# Patient Record
Sex: FEMALE | Race: WHITE | NOT HISPANIC OR LATINO | Employment: OTHER | URBAN - METROPOLITAN AREA
[De-identification: names, ages, dates, MRNs, and addresses within clinical notes are randomized per-mention and may not be internally consistent; named-entity substitution may affect disease eponyms.]

---

## 2017-02-17 ENCOUNTER — HOSPITAL ENCOUNTER (OUTPATIENT)
Dept: MAMMOGRAPHY | Facility: CLINIC | Age: 57
Discharge: HOME | End: 2017-02-17
Attending: FAMILY MEDICINE | Admitting: FAMILY MEDICINE
Payer: COMMERCIAL

## 2017-02-17 DIAGNOSIS — Z12.31 VISIT FOR SCREENING MAMMOGRAM: ICD-10-CM

## 2017-02-17 PROCEDURE — G0202 SCR MAMMO BI INCL CAD: HCPCS

## 2018-02-23 ENCOUNTER — OFFICE VISIT (OUTPATIENT)
Dept: PEDIATRICS | Facility: CLINIC | Age: 58
End: 2018-02-23
Payer: COMMERCIAL

## 2018-02-23 VITALS
HEART RATE: 66 BPM | TEMPERATURE: 98.2 F | SYSTOLIC BLOOD PRESSURE: 110 MMHG | OXYGEN SATURATION: 98 % | DIASTOLIC BLOOD PRESSURE: 68 MMHG

## 2018-02-23 DIAGNOSIS — Z01.411 ENCOUNTER FOR GYNECOLOGICAL EXAMINATION WITH ABNORMAL FINDING: Primary | ICD-10-CM

## 2018-02-23 DIAGNOSIS — Z12.4 SCREENING FOR MALIGNANT NEOPLASM OF CERVIX: ICD-10-CM

## 2018-02-23 DIAGNOSIS — Z13.220 SCREENING CHOLESTEROL LEVEL: ICD-10-CM

## 2018-02-23 DIAGNOSIS — Z13.1 SCREENING FOR DIABETES MELLITUS: ICD-10-CM

## 2018-02-23 DIAGNOSIS — L30.9 ECZEMA, UNSPECIFIED TYPE: ICD-10-CM

## 2018-02-23 DIAGNOSIS — Z11.59 NEED FOR HEPATITIS C SCREENING TEST: ICD-10-CM

## 2018-02-23 DIAGNOSIS — Z12.31 VISIT FOR SCREENING MAMMOGRAM: ICD-10-CM

## 2018-02-23 DIAGNOSIS — Z80.3 FAMILY HISTORY OF MALIGNANT NEOPLASM OF BREAST: ICD-10-CM

## 2018-02-23 DIAGNOSIS — N94.9 FEMALE PERINEAL PRESSURE: ICD-10-CM

## 2018-02-23 PROCEDURE — G0145 SCR C/V CYTO,THINLAYER,RESCR: HCPCS | Performed by: INTERNAL MEDICINE

## 2018-02-23 PROCEDURE — 99386 PREV VISIT NEW AGE 40-64: CPT | Performed by: INTERNAL MEDICINE

## 2018-02-23 PROCEDURE — 80061 LIPID PANEL: CPT | Performed by: INTERNAL MEDICINE

## 2018-02-23 PROCEDURE — 87624 HPV HI-RISK TYP POOLED RSLT: CPT | Performed by: INTERNAL MEDICINE

## 2018-02-23 PROCEDURE — 36415 COLL VENOUS BLD VENIPUNCTURE: CPT | Performed by: INTERNAL MEDICINE

## 2018-02-23 PROCEDURE — 80053 COMPREHEN METABOLIC PANEL: CPT | Performed by: INTERNAL MEDICINE

## 2018-02-23 PROCEDURE — 86803 HEPATITIS C AB TEST: CPT | Performed by: INTERNAL MEDICINE

## 2018-02-23 RX ORDER — HYDROCORTISONE 2.5 %
CREAM (GRAM) TOPICAL 2 TIMES DAILY PRN
Qty: 30 G | Refills: 11 | Status: SHIPPED | OUTPATIENT
Start: 2018-02-23 | End: 2019-03-15

## 2018-02-23 RX ORDER — HYDROCORTISONE 2.5 %
CREAM (GRAM) TOPICAL 2 TIMES DAILY
COMMUNITY
End: 2019-03-15

## 2018-02-23 NOTE — LETTER
March 2, 2018    Sury Castellanos  [address] [place] [address]    Dear Sury,  We are happy to inform you that your PAP smear result from 02/23/18 is normal.  We are now able to do a follow up test on PAP smears. The DNA test is for HPV (Human Papilloma Virus). Cervical cancer is closely linked with certain types of HPV. Your result showed no evidence of high risk HPV.  Therefore we recommend you return in 5 years for your next pap smear and HPV test.  You will still need to return to the clinic every year for an annual exam and other preventive tests.  Please contact the clinic at [phone] with any questions.  Sincerely,    Kamilah Lion MD/raymon

## 2018-02-23 NOTE — PATIENT INSTRUCTIONS
Preventive Health Recommendations  Female Ages 50 - 64    Yearly exam: See your health care provider every year in order to  o Review health changes.   o Discuss preventive care.    o Review your medicines if your doctor has prescribed any.      Get a Pap test every three years (unless you have an abnormal result and your provider advises testing more often).    If you get Pap tests with HPV test, you only need to test every 5 years, unless you have an abnormal result.     You do not need a Pap test if your uterus was removed (hysterectomy) and you have not had cancer.    You should be tested each year for STDs (sexually transmitted diseases) if you're at risk.     Have a mammogram every 1 to 2 years.    Have a colonoscopy at age 50, or have a yearly FIT test (stool test). These exams screen for colon cancer.      Have a cholesterol test every 5 years, or more often if advised.    Have a diabetes test (fasting glucose) every three years. If you are at risk for diabetes, you should have this test more often.     If you are at risk for osteoporosis (brittle bone disease), think about having a bone density scan (DEXA).    Shots: Get a flu shot each year. Get a tetanus shot every 10 years.    Nutrition:     Eat at least 5 servings of fruits and vegetables each day.    Eat whole-grain bread, whole-wheat pasta and brown rice instead of white grains and rice.    Talk to your provider about Calcium and Vitamin D.     Lifestyle    Exercise at least 150 minutes a week (30 minutes a day, 5 days a week). This will help you control your weight and prevent disease.    Limit alcohol to one drink per day.    No smoking.     Wear sunscreen to prevent skin cancer.     See your dentist every six months for an exam and cleaning.    See your eye doctor every 1 to 2 years.    ----------------------  1. Pap today with HPV test  2. Schedule mammogram - [phone]  3. Labs today: cholesterol, diabetes screen, liver function, kidney  function and hepatitis C screening  4. Refilled hydrocortisone cream  5. Can try replens vaginal moisturizer 2-3 times a week at bedtime to see if helps with pressure feeling  6. Will get previous records

## 2018-02-23 NOTE — LETTER
[facility]  [address]  [place] [address]                  [phone]   February 26, 2018    Sury Vannalizzy Castellanos  [address]  [facility] [address]      Dear Sury,    Here is a summary of your recent test results:    1. Your electrolytes, liver function kidney function, diabetes screen and hepatitis C screening are all normal.    2. Your cholesterol looks great. Your total cholesterol is 164 (normal is 200 or less). Your LDL or bad cholesterol is 91 (normal is less than 100). Your HDL or good cholesterol is 55 (normal is 50 or higher). I recommend rechecking your cholesterol in 3-5 years.    Please let me know if you have any questions.    Your test results are enclosed.      Please contact me if you have any questions.           Thank you very much for choosing [facility]    Best regards,    Kamilah Lion MD        Results for orders placed or performed in visit on 02/23/18   Hepatitis C Screen Reflex to HCV RNA Quant and Genotype   Result Value Ref Range    Hepatitis C Antibody Nonreactive NR^Nonreactive   Lipid panel reflex to direct LDL Fasting   Result Value Ref Range    Cholesterol 164 <200 mg/dL    Triglycerides 88 <150 mg/dL    HDL Cholesterol 55 >49 mg/dL    LDL Cholesterol Calculated 91 <100 mg/dL    Non HDL Cholesterol 109 <130 mg/dL   Comprehensive metabolic panel   Result Value Ref Range    Sodium 138 133 - 144 mmol/L    Potassium 4.2 3.4 - 5.3 mmol/L    Chloride 105 94 - 109 mmol/L    Carbon Dioxide 29 20 - 32 mmol/L    Anion Gap 4 3 - 14 mmol/L    Glucose 83 70 - 99 mg/dL    Urea Nitrogen 19 7 - 30 mg/dL    Creatinine 0.89 0.52 - 1.04 mg/dL    GFR Estimate 65 >60 mL/min/1.7m2    GFR Estimate If Black 79 >60 mL/min/1.7m2    Calcium 8.9 8.5 - 10.1 mg/dL    Bilirubin Total 0.5 0.2 - 1.3 mg/dL    Albumin 4.2 3.4 - 5.0 g/dL    Protein Total 7.4 6.8 - 8.8 g/dL    Alkaline Phosphatase 72 40 - 150 U/L    ALT 49 0 - 50 U/L    AST 25 0  - 45 U/L

## 2018-02-23 NOTE — MR AVS SNAPSHOT
After Visit Summary   2/23/2018    Sury Castellanos    MRN: [id number]           Patient Information     Date Of Birth          1960        Visit Information        Provider Department      2/23/2018 8:20 AM Kamilah Lion MD [facility]        Today's Diagnoses     Encounter for gynecological examination with abnormal finding    -  1    Screening for malignant neoplasm of cervix        Eczema, unspecified type        Need for hepatitis C screening test        Screening for diabetes mellitus        Screening cholesterol level        Visit for screening mammogram        Family history of malignant neoplasm of breast          Care Instructions      Preventive Health Recommendations  Female Ages 50 - 64    Yearly exam: See your health care provider every year in order to  o Review health changes.   o Discuss preventive care.    o Review your medicines if your doctor has prescribed any.      Get a Pap test every three years (unless you have an abnormal result and your provider advises testing more often).    If you get Pap tests with HPV test, you only need to test every 5 years, unless you have an abnormal result.     You do not need a Pap test if your uterus was removed (hysterectomy) and you have not had cancer.    You should be tested each year for STDs (sexually transmitted diseases) if you're at risk.     Have a mammogram every 1 to 2 years.    Have a colonoscopy at age 50, or have a yearly FIT test (stool test). These exams screen for colon cancer.      Have a cholesterol test every 5 years, or more often if advised.    Have a diabetes test (fasting glucose) every three years. If you are at risk for diabetes, you should have this test more often.     If you are at risk for osteoporosis (brittle bone disease), think about having a bone density scan (DEXA).    Shots: Get a flu shot each year. Get a tetanus shot every 10 years.    Nutrition:     Eat at least 5 servings of  fruits and vegetables each day.    Eat whole-grain bread, whole-wheat pasta and brown rice instead of white grains and rice.    Talk to your provider about Calcium and Vitamin D.     Lifestyle    Exercise at least 150 minutes a week (30 minutes a day, 5 days a week). This will help you control your weight and prevent disease.    Limit alcohol to one drink per day.    No smoking.     Wear sunscreen to prevent skin cancer.     See your dentist every six months for an exam and cleaning.    See your eye doctor every 1 to 2 years.    ----------------------  1. Pap today with HPV test  2. Schedule mammogram - [phone]  3. Labs today: cholesterol, diabetes screen, liver function, kidney function and hepatitis C screening  4. Refilled hydrocortisone cream  5. Can try replens vaginal moisturizer 2-3 times a week at bedtime to see if helps with pressure feeling  6. Will get previous records            Follow-ups after your visit        Follow-up notes from your care team     Return in about 1 year (around 2/23/2019).      Future tests that were ordered for you today     Open Future Orders        Priority Expected Expires Ordered    MA Screen Bilateral w/Lopez Routine  2/23/2019 2/23/2018            Who to contact     If you have questions or need follow up information about today's clinic visit or your schedule please contact [facility] directly at [phone].  Normal or non-critical lab and imaging results will be communicated to you by MyChart, letter or phone within 4 business days after the clinic has received the results. If you do not hear from us within 7 days, please contact the clinic through [employer] or phone. If you have a critical or abnormal lab result, we will notify you by phone as soon as possible.  Submit refill requests through [employer] or call your pharmacy and they will forward the refill request to us. Please allow 3 business days for your refill to be completed.          Additional  "Information About Your Visit        MyChart Information     [employer] lets you send messages to your doctor, view your test results, renew your prescriptions, schedule appointments and more. To sign up, go to [email or web address] . Click on \"Log in\" on the left side of the screen, which will take you to the Welcome page. Then click on \"Sign up Now\" on the right side of the page.     You will be asked to enter the access code listed below, as well as some personal information. Please follow the directions to create your username and password.     Your access code is: [email or web address]  Expires: 2018  8:41 AM     Your access code will  in 90 days. If you need help or a new code, please call your [place] clinic or [phone].        Care EveryWhere ID     This is your Care EveryWhere ID. This could be used by other organizations to access your [place] medical records  [id number]        Your Vitals Were     Pulse Temperature Pulse Oximetry             66 98.2  F (36.8  C) (Tympanic) 98%          Blood Pressure from Last 3 Encounters:   18 110/68   10/14/16 120/84    Weight from Last 3 Encounters:   10/14/16 153 lb (69.4 kg)   14 152 lb (68.9 kg)   13 150 lb (68 kg)              We Performed the Following     Comprehensive metabolic panel     Hepatitis C Screen Reflex to HCV RNA Quant and Genotype     HPV High Risk Types DNA Cervical     Lipid panel reflex to direct LDL Fasting     Pap imaged thin layer screen with HPV - recommended age 30 - 65 years (select HPV order below)          Today's Medication Changes          These changes are accurate as of 18  8:41 AM.  If you have any questions, ask your nurse or doctor.               These medicines have changed or have updated prescriptions.        Dose/Directions    * hydrocortisone 2.5 % cream   This may have changed:  Another medication with the same name was added. Make sure you understand how and when to take each.   Changed by:  " Kamilah Lion MD        Apply topically 2 times daily   Refills:  0       * hydrocortisone 2.5 % cream   This may have changed:  You were already taking a medication with the same name, and this prescription was added. Make sure you understand how and when to take each.   Used for:  Eczema, unspecified type   Changed by:  Kamilah Lion MD        Apply topically 2 times daily as needed Please place on file, do not fill   Quantity:  30 g   Refills:  11       * Notice:  This list has 2 medication(s) that are the same as other medications prescribed for you. Read the directions carefully, and ask your doctor or other care provider to review them with you.         Where to get your medicines      These medications [id number] [place] [facility] [address] AT [facility] [place] [id number], [place] [address]     Phone:  [phone]     hydrocortisone 2.5 % cream                Primary Care Provider Office Phone # Fax #    Anitha Serina Zavaleta [phone]       [facility] [address] [facility] [address]        Equal Access to Services     [facility] Hospital[facility]LLIE : Hadii aad ku hadasho Soomaali, waaxda luqadaha, qaybta kaalmada adeegyada, david robbins . So [facility] [phone].    ATENCIÓN: Si habla español, tiene a dumont disposición servicios gratuitos de asistencia lingüística. [facility] [phone].    We comply with applicable federal civil rights laws and [place] laws. We do not discriminate on the basis of race, color, national origin, age, disability, sex, sexual orientation, or gender identity.            Thank you!     Thank you for choosing [facility]  for your care. Our goal is always to provide you with excellent care. Hearing back from our patients is one way we can continue to improve our services. Please take a few minutes to complete the written survey that you may receive  in the mail after your visit with us. Thank you!             Your Updated Medication List - Protect others around you: Learn how to safely use, store and throw away your medicines at www.disposemymeds.org.          This list is accurate as of 2/23/18  8:41 AM.  Always use your most recent med list.                   Brand Name Dispense Instructions for use Diagnosis    ADVIL COLD/SINUS PO       Right foot pain, Peroneal tendinitis of right lower extremity, Left tibialis posterior tendinitis, Left foot pain, Pes cavus, congenital       diclofenac 1 % Gel topical gel    VOLTAREN    100 g    Apply 4 grams to knees or 2 grams to hands four times daily using enclosed dosing card.    Right foot pain, Peroneal tendinitis of right lower extremity, Left tibialis posterior tendinitis, Left foot pain, Pes cavus, congenital       * hydrocortisone 2.5 % cream      Apply topically 2 times daily        * hydrocortisone 2.5 % cream     30 g    Apply topically 2 times daily as needed Please place on file, do not fill    Eczema, unspecified type       MULTIVITAMIN ADULT PO       Right foot pain, Peroneal tendinitis of right lower extremity, Left tibialis posterior tendinitis, Left foot pain, Pes cavus, congenital       VITAMIN C PO       Right foot pain, Peroneal tendinitis of right lower extremity, Left tibialis posterior tendinitis, Left foot pain, Pes cavus, congenital       * Notice:  This list has 2 medication(s) that are the same as other medications prescribed for you. Read the directions carefully, and ask your doctor or other care provider to review them with you.

## 2018-02-23 NOTE — PROGRESS NOTES
"   SUBJECTIVE:   CC: Sury Castellanos is an 57 year old woman who presents for preventive health visit.     Physical   Annual:     Getting at least 3 servings of Calcium per day::  Yes    Bi-annual eye exam::  Yes    Dental care twice a year::  Yes    Sleep apnea or symptoms of sleep apnea::  None    Diet::  Regular (no restrictions)    Frequency of exercise::  2-3 days/week    Duration of exercise::  15-30 minutes    Taking medications regularly::  Yes    Medication side effects::  Not applicable    Additional concerns today::  YES          Patient new to clinic. Previous care at [facility]. [facility] completed.  passed away last fall.     Concerns today:   1. hydrocortisone cream prescription refill - uses for eczema.  2. Feels pressure when sitting - started a few weeks ago. Sits a lot at work. Feels like when \"tampon is dry.\" last period at 50. Wonders if due to vaginal dryness.  3. Would like mammo this year due to family history - mother and sister    Colonoscopy at 50 and normal  Last pap 2 years ago and normal  Reports Tdap UTD    Today's PHQ-2 Score:   PHQ-2 ( 1999 [employer]) 2/23/2018   Q1: Little interest or pleasure in doing things 0   Q2: Feeling down, depressed or hopeless 0   PHQ-2 Score 0   Q1: Little interest or pleasure in doing things Not at all   Q2: Feeling down, depressed or hopeless Not at all   PHQ-2 Score 0     Abuse: Current or Past(Physical, Sexual or Emotional)- No  Do you feel safe in your environment - Yes    Social History   Substance Use Topics     Smoking status: Never Smoker     Smokeless tobacco: Never Used     Alcohol use 0.0 oz/week     0 Standard drinks or equivalent per week     Alcohol Use 2/23/2018   If you drink alcohol, do you typically have greater than 3 drinks per day OR greater than 7 drinks per week?   No     Reviewed orders with patient.  Reviewed health maintenance and updated orders accordingly - Yes  Patient Active Problem List   Diagnosis     " Family history of malignant neoplasm of breast     Eczema, unspecified type     Past Surgical History:   Procedure Laterality Date     GYN SURGERY      uterine polyp removal       Social History   Substance Use Topics     Smoking status: Never Smoker     Smokeless tobacco: Never Used     Alcohol use 0.0 oz/week     0 Standard drinks or equivalent per week     Family History   Problem Relation Age of Onset     Breast Cancer Mother 58     Lung Cancer Father      smoker     Breast Cancer Sister 40           Patient over age 50, mutual decision to screen reflected in health maintenance.    Pertinent mammograms are reviewed under the imaging tab.  History of abnormal Pap smear: NO - age 30-65 PAP every 5 years with negative HPV co-testing recommended    Reviewed and updated as needed this visit by clinical staff  Tobacco  Allergies  Meds  Problems  Med Hx  Surg Hx  Fam Hx  Soc Hx        Reviewed and updated as needed this visit by Provider  Allergies  Meds  Problems        Review of Systems  C: NEGATIVE for fever, chills, change in weight  I: NEGATIVE for worrisome rashes, moles or lesions  E: NEGATIVE for vision changes or irritation  ENT: NEGATIVE for ear, mouth and throat problems  R: NEGATIVE for significant cough or SOB  B: NEGATIVE for masses, tenderness or discharge  CV: NEGATIVE for chest pain, palpitations or peripheral edema  GI: NEGATIVE for nausea, abdominal pain, heartburn, or change in bowel habits  : perineal pressure with sitting, NEGATIVE for unusual urinary or vaginal symptoms. No vaginal bleeding.  M: NEGATIVE for significant arthralgias or myalgia  N: NEGATIVE for weakness, dizziness or paresthesias  P: NEGATIVE for changes in mood or affect      OBJECTIVE:   /68 (Cuff Size: Adult Regular)  Pulse 66  Temp 98.2  F (36.8  C) (Tympanic)  SpO2 98%  Physical Exam  GENERAL APPEARANCE: healthy, alert and no distress  EYES: Eyes grossly normal to inspection, PERRL and conjunctivae and  sclerae normal  HENT: ear canals and TM's normal, nose and mouth without ulcers or lesions, oropharynx clear and oral mucous membranes moist  NECK: no adenopathy, no asymmetry, masses, or scars and thyroid normal to palpation  RESP: lungs clear to auscultation - no rales, rhonchi or wheezes  BREAST: normal without masses, tenderness or nipple discharge and no palpable axillary masses or adenopathy  CV: regular rate and rhythm, normal S1 S2, no S3 or S4, no murmur, click or rub, no peripheral edema and peripheral pulses strong  ABDOMEN: soft, nontender, no hepatosplenomegaly, no masses and bowel sounds normal   (female): normal female external genitalia, normal urethral meatus, vaginal mucosal atrophy noted, normal cervix, adnexae, and uterus without masses or abnormal discharge  MS: no musculoskeletal defects are noted and gait is age appropriate without ataxia  SKIN: eczematous patch over right inferior buttock, no suspicious lesions or rashes  NEURO: Normal strength and tone, sensory exam grossly normal, mentation intact and speech normal  PSYCH: mentation appears normal and affect normal/bright    ASSESSMENT/PLAN:   1. Encounter for gynecological examination with abnormal finding  Pap today, will schedule mammo  Colonoscopy UTD  Patient thinks Tdap UTD - will get records    2. Screening for malignant neoplasm of cervix  - Pap imaged thin layer screen with HPV - recommended age 30 - 65 years (select HPV order below)  - HPV High Risk Types DNA Cervical    3. Eczema, unspecified type  Controlled with hydrocortisone cream  - hydrocortisone 2.5 % cream; Apply topically 2 times daily as needed Please place on file, do not fill  Dispense: 30 g; Refill: 11    4. Female perineal pressure  No prolapse on exam. Normal bimanual exam. Suspect this is due to vaginal atrophy/dryness. Discussed Replens 2-3 times a week at night. If not improving, would consider pelvic ultrasound    5. Need for hepatitis C screening test  -  "Hepatitis C Screen Reflex to HCV RNA Quant and Genotype    6. Screening for diabetes mellitus  - Comprehensive metabolic panel    7. Screening cholesterol level  - Lipid panel reflex to direct LDL Fasting    8. Visit for screening mammogram  - MA Screen Bilateral w/Lopez; Future    9. Family history of malignant neoplasm of breast  Yearly mammo    COUNSELING:  Reviewed preventive health counseling, as reflected in patient instructions  Special attention given to:        Regular exercise       Healthy diet/nutrition       Colon cancer screening       Consider Hep C screening for patients born between 1945 and 1965       (Joann)menopause management     reports that she has never smoked. She has never used smokeless tobacco.    Estimated body mass index is 24.32 kg/(m^2) as calculated from the following:    Height as of 10/14/16: 5' 6.5\" (1.689 m).    Weight as of 10/14/16: 153 lb (69.4 kg).     Counseling Resources:  ATP IV Guidelines  Pooled Cohorts Equation Calculator  Breast Cancer Risk Calculator  FRAX Risk Assessment  ICSI Preventive Guidelines  Dietary Guidelines for Americans, 2010  USDA's MyPlate  ASA Prophylaxis  Lung CA Screening    Kamilah Lion MD  [facility] [place]  "

## 2018-02-24 LAB — HCV AB SERPL QL IA: NONREACTIVE

## 2018-02-25 LAB
ALBUMIN SERPL-MCNC: 4.2 G/DL (ref 3.4–5)
ALP SERPL-CCNC: 72 U/L (ref 40–150)
ALT SERPL W P-5'-P-CCNC: 49 U/L (ref 0–50)
ANION GAP SERPL CALCULATED.3IONS-SCNC: 4 MMOL/L (ref 3–14)
AST SERPL W P-5'-P-CCNC: 25 U/L (ref 0–45)
BILIRUB SERPL-MCNC: 0.5 MG/DL (ref 0.2–1.3)
BUN SERPL-MCNC: 19 MG/DL (ref 7–30)
CALCIUM SERPL-MCNC: 8.9 MG/DL (ref 8.5–10.1)
CHLORIDE SERPL-SCNC: 105 MMOL/L (ref 94–109)
CHOLEST SERPL-MCNC: 164 MG/DL
CO2 SERPL-SCNC: 29 MMOL/L (ref 20–32)
CREAT SERPL-MCNC: 0.89 MG/DL (ref 0.52–1.04)
GFR SERPL CREATININE-BSD FRML MDRD: 65 ML/MIN/1.7M2
GLUCOSE SERPL-MCNC: 83 MG/DL (ref 70–99)
HDLC SERPL-MCNC: 55 MG/DL
LDLC SERPL CALC-MCNC: 91 MG/DL
NONHDLC SERPL-MCNC: 109 MG/DL
POTASSIUM SERPL-SCNC: 4.2 MMOL/L (ref 3.4–5.3)
PROT SERPL-MCNC: 7.4 G/DL (ref 6.8–8.8)
SODIUM SERPL-SCNC: 138 MMOL/L (ref 133–144)
TRIGL SERPL-MCNC: 88 MG/DL

## 2018-02-27 LAB
COPATH REPORT: NORMAL
PAP: NORMAL

## 2018-03-01 LAB
FINAL DIAGNOSIS: NORMAL
HPV HR 12 DNA CVX QL NAA+PROBE: NEGATIVE
HPV16 DNA SPEC QL NAA+PROBE: NEGATIVE
HPV18 DNA SPEC QL NAA+PROBE: NEGATIVE
SPECIMEN DESCRIPTION: NORMAL
SPECIMEN SOURCE CVX/VAG CYTO: NORMAL

## 2018-03-09 ENCOUNTER — HOSPITAL ENCOUNTER (OUTPATIENT)
Dept: MAMMOGRAPHY | Facility: CLINIC | Age: 58
Discharge: HOME | End: 2018-03-09
Attending: INTERNAL MEDICINE | Admitting: INTERNAL MEDICINE
Payer: COMMERCIAL

## 2018-03-09 DIAGNOSIS — Z12.31 VISIT FOR SCREENING MAMMOGRAM: ICD-10-CM

## 2018-03-09 PROCEDURE — 77063 BREAST TOMOSYNTHESIS BI: CPT

## 2019-03-11 ENCOUNTER — DOCUMENTATION ONLY (OUTPATIENT)
Dept: PEDIATRICS | Facility: CLINIC | Age: 59
End: 2019-03-11

## 2019-03-11 DIAGNOSIS — Z13.220 SCREENING CHOLESTEROL LEVEL: Primary | ICD-10-CM

## 2019-03-11 DIAGNOSIS — Z13.1 SCREENING FOR DIABETES MELLITUS: ICD-10-CM

## 2019-03-11 NOTE — PROGRESS NOTES
Patient has an up coming lab appointment on  03/14/19 . Please review and place future orders that may be needed.    Thank you,  Sangeeta Duval MLT

## 2019-03-15 ENCOUNTER — ANCILLARY PROCEDURE (OUTPATIENT)
Dept: MAMMOGRAPHY | Facility: CLINIC | Age: 59
End: 2019-03-15
Attending: INTERNAL MEDICINE
Payer: COMMERCIAL

## 2019-03-15 ENCOUNTER — OFFICE VISIT (OUTPATIENT)
Dept: PEDIATRICS | Facility: CLINIC | Age: 59
End: 2019-03-15
Payer: COMMERCIAL

## 2019-03-15 VITALS
OXYGEN SATURATION: 98 % | HEART RATE: 59 BPM | WEIGHT: 164.1 LBS | DIASTOLIC BLOOD PRESSURE: 70 MMHG | SYSTOLIC BLOOD PRESSURE: 120 MMHG | TEMPERATURE: 98.2 F | BODY MASS INDEX: 26.37 KG/M2 | HEIGHT: 66 IN

## 2019-03-15 DIAGNOSIS — Z12.39 BREAST CANCER SCREENING: ICD-10-CM

## 2019-03-15 DIAGNOSIS — Z00.00 ROUTINE HISTORY AND PHYSICAL EXAMINATION OF ADULT: Primary | ICD-10-CM

## 2019-03-15 DIAGNOSIS — L30.9 ECZEMA, UNSPECIFIED TYPE: ICD-10-CM

## 2019-03-15 DIAGNOSIS — Z23 NEED FOR PROPHYLACTIC VACCINATION WITH TETANUS-DIPHTHERIA (TD): ICD-10-CM

## 2019-03-15 PROCEDURE — 77067 SCR MAMMO BI INCL CAD: CPT | Mod: TC

## 2019-03-15 PROCEDURE — 99396 PREV VISIT EST AGE 40-64: CPT | Mod: 25 | Performed by: INTERNAL MEDICINE

## 2019-03-15 PROCEDURE — 90715 TDAP VACCINE 7 YRS/> IM: CPT | Performed by: INTERNAL MEDICINE

## 2019-03-15 PROCEDURE — 90471 IMMUNIZATION ADMIN: CPT | Performed by: INTERNAL MEDICINE

## 2019-03-15 PROCEDURE — 77063 BREAST TOMOSYNTHESIS BI: CPT | Mod: TC

## 2019-03-15 RX ORDER — HYDROCORTISONE 2.5 %
CREAM (GRAM) TOPICAL 2 TIMES DAILY PRN
Qty: 30 G | Refills: 11 | Status: SHIPPED | OUTPATIENT
Start: 2019-03-15 | End: 2020-07-10

## 2019-03-15 ASSESSMENT — ENCOUNTER SYMPTOMS
CONSTIPATION: 0
ROS SKIN COMMENTS: ECZEMA
MYALGIAS: 0
DYSURIA: 0
HEMATURIA: 0
NERVOUS/ANXIOUS: 0
PALPITATIONS: 0
BREAST MASS: 0
DIZZINESS: 0
SHORTNESS OF BREATH: 0
SORE THROAT: 0
HEARTBURN: 0
FEVER: 0
NAUSEA: 0
ABDOMINAL PAIN: 0
HEMATOCHEZIA: 0
PARESTHESIAS: 0
CHILLS: 0
JOINT SWELLING: 0
DIARRHEA: 0
FREQUENCY: 0
COUGH: 0
EYE PAIN: 0
HEADACHES: 0
WEAKNESS: 0

## 2019-03-15 ASSESSMENT — MIFFLIN-ST. JEOR: SCORE: 1341.1

## 2019-03-15 NOTE — PATIENT INSTRUCTIONS
Preventive Health Recommendations  Female Ages 50 - 64    Yearly exam: See your health care provider every year in order to  o Review health changes.   o Discuss preventive care.    o Review your medicines if your doctor has prescribed any.      Get a Pap test every three years (unless you have an abnormal result and your provider advises testing more often).    If you get Pap tests with HPV test, you only need to test every 5 years, unless you have an abnormal result.     You do not need a Pap test if your uterus was removed (hysterectomy) and you have not had cancer.    You should be tested each year for STDs (sexually transmitted diseases) if you're at risk.     Have a mammogram every 1 to 2 years.    Have a colonoscopy at age 50, or have a yearly FIT test (stool test). These exams screen for colon cancer.      Have a cholesterol test every 5 years, or more often if advised.    Have a diabetes test (fasting glucose) every three years. If you are at risk for diabetes, you should have this test more often.     If you are at risk for osteoporosis (brittle bone disease), think about having a bone density scan (DEXA).    Shots: Get a flu shot each year. Get a tetanus shot every 10 years.    Nutrition:     Eat at least 5 servings of fruits and vegetables each day.    Eat whole-grain bread, whole-wheat pasta and brown rice instead of white grains and rice.    Get adequate Calcium and Vitamin D.     Lifestyle    Exercise at least 150 minutes a week (30 minutes a day, 5 days a week). This will help you control your weight and prevent disease.    Limit alcohol to one drink per day.    No smoking.     Wear sunscreen to prevent skin cancer.     See your dentist every six months for an exam and cleaning.    See your eye doctor every 1 to 2 years.  ----------------  1. Hydrocortisone 2.5% cream 2 times daily as needed   - if not improving over next 1-2 months, let me know and will send a prescription for a stronger steroid  cream (triamcinolone)  2. Tetanus/whooping cough vaccine today  3. Check with insurance to see if Shingrix shingles vaccine is covered OR can stop in pharmacy and have them run it through your insurance and get the vaccine done there.  - 2nd dose is 2-6 months after the first dose

## 2019-03-15 NOTE — PROGRESS NOTES
SUBJECTIVE:   CC: Sury Castellanos is an 58 year old woman who presents for preventive health visit.     Physical   Annual:     Getting at least 3 servings of Calcium per day:  Yes    Bi-annual eye exam:  Yes    Dental care twice a year:  Yes    Sleep apnea or symptoms of sleep apnea:  None    Diet:  Regular (no restrictions)    Frequency of exercise:  2-3 days/week    Duration of exercise:  45-60 minutes    Taking medications regularly:  Yes    Medication side effects:  None    Additional concerns today:  Yes    PHQ-2 Total Score: 0    Eczema flare left elbow, would like cream reordered - prescribed 2.5% hydrocortisone last year, mague never got filled because didn't think was that bad. Now worse again and 1% cream not helping. Never completely goes away.    Colonoscopy - will be off of 's insurance in September. Currently on Cobra. Will turn 60 a couple of months later. Wondering if can do colonoscopy earlier    Today's PHQ-2 Score:   PHQ-2 ( 1999 Pfizer) 3/15/2019   Q1: Little interest or pleasure in doing things 0   Q2: Feeling down, depressed or hopeless 0   PHQ-2 Score 0   Q1: Little interest or pleasure in doing things Not at all   Q2: Feeling down, depressed or hopeless Not at all   PHQ-2 Score 0       Abuse: Current or Past(Physical, Sexual or Emotional)- No  Do you feel safe in your environment? Yes    Social History     Tobacco Use     Smoking status: Never Smoker     Smokeless tobacco: Never Used   Substance Use Topics     Alcohol use: Yes     Alcohol/week: 0.0 oz     Alcohol Use 3/15/2019   If you drink alcohol do you typically have greater than 3 drinks per day OR greater than 7 drinks per week? No   No flowsheet data found.    Reviewed orders with patient.  Reviewed health maintenance and updated orders accordingly - Yes  Patient Active Problem List   Diagnosis     Family history of malignant neoplasm of breast     Eczema, unspecified type     Past Surgical History:   Procedure Laterality  Date     GYN SURGERY      uterine polyp removal       Social History     Tobacco Use     Smoking status: Never Smoker     Smokeless tobacco: Never Used   Substance Use Topics     Alcohol use: Yes     Alcohol/week: 0.0 oz     Family History   Problem Relation Age of Onset     Breast Cancer Mother 58     Lung Cancer Father         smoker     Breast Cancer Sister 40           Mammogram Screening: Patient over age 50, mutual decision to screen reflected in health maintenance.    Pertinent mammograms are reviewed under the imaging tab.  History of abnormal Pap smear: NO - age 30-65 PAP every 5 years with negative HPV co-testing recommended  PAP / HPV Latest Ref Rng & Units 2/23/2018   PAP - NIL   HPV 16 DNA NEG:Negative Negative   HPV 18 DNA NEG:Negative Negative   OTHER HR HPV NEG:Negative Negative     Reviewed and updated as needed this visit by clinical staff  Tobacco  Allergies  Meds  Problems  Med Hx  Surg Hx  Fam Hx  Soc Hx          Reviewed and updated as needed this visit by Provider  Tobacco  Allergies  Meds  Problems  Med Hx  Surg Hx  Fam Hx          Review of Systems   Constitutional: Negative for chills and fever.   HENT: Negative for congestion, ear pain, hearing loss and sore throat.    Eyes: Negative for pain and visual disturbance.   Respiratory: Negative for cough and shortness of breath.    Cardiovascular: Negative for chest pain, palpitations and peripheral edema.   Gastrointestinal: Negative for abdominal pain, constipation, diarrhea, heartburn, hematochezia and nausea.   Breasts:  Negative for tenderness, breast mass and discharge.   Genitourinary: Negative for dysuria, frequency, genital sores, hematuria, pelvic pain, urgency, vaginal bleeding and vaginal discharge.   Musculoskeletal: Negative for joint swelling and myalgias.   Skin: Negative for rash.        eczema   Neurological: Negative for dizziness, weakness, headaches and paresthesias.   Psychiatric/Behavioral: Negative for mood  "changes. The patient is not nervous/anxious.    All other systems reviewed and are negative.    OBJECTIVE:   /70 (BP Location: Right arm, Patient Position: Sitting, Cuff Size: Adult Regular)   Pulse 59   Temp 98.2  F (36.8  C) (Tympanic)   Ht 1.676 m (5' 6\")   Wt 74.4 kg (164 lb 1.6 oz)   SpO2 98%   BMI 26.49 kg/m    Physical Exam  GENERAL: healthy, alert and no distress  EYES: Eyes grossly normal to inspection, PERRL and conjunctivae and sclerae normal  HENT: ear canals and TM's normal, nose and mouth without ulcers or lesions  NECK: no adenopathy, no asymmetry, masses, or scars and thyroid normal to palpation  RESP: lungs clear to auscultation - no rales, rhonchi or wheezes  BREAST: deferred to mammo earlier today  CV: regular rate and rhythm, normal S1 S2, no S3 or S4, no murmur, click or rub, no peripheral edema and peripheral pulses strong  ABDOMEN: soft, nontender, no hepatosplenomegaly, no masses and bowel sounds normal  MS: no gross musculoskeletal defects noted, no edema  SKIN: large eczematous plaque over left elbow about 3 x 7 cm in size with some scaling.  NEURO: Normal strength and tone, mentation intact and speech normal  PSYCH: mentation appears normal, affect normal/bright    Diagnostic Test Results:  Results for orders placed or performed in visit on 03/15/19 (from the past 24 hour(s))   MA Screen Bilateral w/Lopez    Narrative    Examination: Bilateral digital screening mammography with computer  aided detection including digital breast tomosynthesis, 3/15/2019 1:00  PM.    Comparison: 03/09/18, 02/17/17, 04/11/14, 03/29/13    History: No current breast concerns. Mother and sister with breast  cancer. Patient reports prior benign biopsy.    BREAST DENSITY: Heterogeneously dense.    COMMENTS:  No suspicious finding.  Biopsy marker in the RIGHT breast.      Impression    IMPRESSION: BI-RADS CATEGORY: 1 -  NEGATIVE.    RECOMMENDED FOLLOW-UP: Annual Mammography.      The patient will be " "notified of the results.     STEWART MATUTE MD       ASSESSMENT/PLAN:   1. Routine history and physical examination of adult  Pap, mammo, colonoscopy UTD  Will check with insurance to see if they would cover colonoscopy before 10 years is up.  Tdap today  Declines flu vaccine  Discussed Shingrix vaccine    2. Eczema, unspecified type  Never tried therapy discussed last year - will start with this. If not responding in next month or two, will let me know and will switch to triamcinolone cream.  - hydrocortisone 2.5 % cream; Apply topically 2 times daily as needed for rash  Dispense: 30 g; Refill: 11    3. Need for prophylactic vaccination with tetanus-diphtheria (Td)  - TDAP VACCINE (ADACEL)  -      ADMIN VACCINE, FIRST    COUNSELING:  Reviewed preventive health counseling, as reflected in patient instructions  Special attention given to:        Regular exercise       Healthy diet/nutrition       Immunizations    Vaccinated for: TDAP         Colon cancer screening    BP Readings from Last 1 Encounters:   03/15/19 120/70     Estimated body mass index is 26.49 kg/m  as calculated from the following:    Height as of this encounter: 1.676 m (5' 6\").    Weight as of this encounter: 74.4 kg (164 lb 1.6 oz).    BP Screening:   Last 3 BP Readings:    BP Readings from Last 3 Encounters:   03/15/19 120/70   02/23/18 110/68   10/14/16 120/84     The following was recommended to the patient:  Re-screen BP within a year and recommended lifestyle modifications  Weight management plan: Discussed healthy diet and exercise guidelines     reports that  has never smoked. she has never used smokeless tobacco.      Counseling Resources:  ATP IV Guidelines  Pooled Cohorts Equation Calculator  Breast Cancer Risk Calculator  FRAX Risk Assessment  ICSI Preventive Guidelines  Dietary Guidelines for Americans, 2010  USDA's MyPlate  ASA Prophylaxis  Lung CA Screening    Kamilah Lion MD  [facility] [place]  "

## 2019-06-20 ENCOUNTER — TELEPHONE (OUTPATIENT)
Dept: PEDIATRICS | Facility: CLINIC | Age: 59
End: 2019-06-20

## 2019-06-20 DIAGNOSIS — L30.9 ECZEMA, UNSPECIFIED TYPE: Primary | ICD-10-CM

## 2019-06-20 NOTE — TELEPHONE ENCOUNTER
Reason for call:  Other   Patient called regarding (reason for call): prescription  Additional comments: Patient would like to see if something else can be called in or a stronger dosage for the hydrocortisone 2.5 cream.  Dr Lion told her if it wasn't working the best to call and have something else called in.  Uses [employer]    Phone number to reach patient:  Cell number on file:    Telephone Information:   Mobile [phone]       Best Time:  any    Can we leave a detailed message on this number?  YES

## 2019-06-21 RX ORDER — TRIAMCINOLONE ACETONIDE 5 MG/G
1 OINTMENT TOPICAL 2 TIMES DAILY
Qty: 30 G | Refills: 0 | Status: SHIPPED | OUTPATIENT
Start: 2019-06-21 | End: 2020-07-10

## 2019-06-21 NOTE — TELEPHONE ENCOUNTER
"Per 3/15/19 office visit with PCP: \"SKIN: large eczematous plaque over left elbow about 3 x 7 cm in size with some scaling.    Plan: 2. Eczema, unspecified type  Never tried therapy discussed last year - will start with this. If not responding in next month or two, will let me know and will switch to triamcinolone cream.  - hydrocortisone 2.5 % cream; Apply topically 2 times daily as needed for rash  Dispense: 30 g; Refill: 11\"    T'd up, please send if ok.  Yudy Juan RN  Message handled by Nurse Triage.    "

## 2020-03-07 ENCOUNTER — OFFICE VISIT (OUTPATIENT)
Dept: URGENT CARE | Facility: URGENT CARE | Age: 60
End: 2020-03-07
Payer: COMMERCIAL

## 2020-03-07 VITALS
HEART RATE: 56 BPM | DIASTOLIC BLOOD PRESSURE: 87 MMHG | TEMPERATURE: 98 F | OXYGEN SATURATION: 97 % | SYSTOLIC BLOOD PRESSURE: 129 MMHG

## 2020-03-07 DIAGNOSIS — L03.012 CELLULITIS OF LEFT MIDDLE FINGER: Primary | ICD-10-CM

## 2020-03-07 PROCEDURE — 99213 OFFICE O/P EST LOW 20 MIN: CPT | Performed by: PHYSICIAN ASSISTANT

## 2020-03-07 RX ORDER — CEPHALEXIN 500 MG/1
500 CAPSULE ORAL 3 TIMES DAILY
Qty: 21 CAPSULE | Refills: 0 | Status: SHIPPED | OUTPATIENT
Start: 2020-03-07 | End: 2020-07-10

## 2020-03-08 NOTE — PROGRESS NOTES
SUBJECTIVE:  Sury Castellanos is a 59 year old female who presents complaining of left third finger pain.  She has noted some redness and swelling along the cuticle margin.  Symptoms began 3-4 days ago.   Severity: mild.  She denies any trauma to the area.  No fevers or chills noted.  No migration of redness or swelling proximally.      Past Medical History:   Diagnosis Date     Eczema, unspecified type 2/23/2018     Current Outpatient Medications   Medication Sig Dispense Refill     hydrocortisone 2.5 % cream Apply topically 2 times daily as needed for rash (Patient not taking: Reported on 3/7/2020) 30 g 11     Multiple Vitamins-Minerals (MULTIVITAMIN ADULT PO)        Pseudoephedrine-Ibuprofen (ADVIL COLD/SINUS PO)        triamcinolone (KENALOG) 0.5 % external ointment Apply 1 g topically 2 times daily (Patient not taking: Reported on 3/7/2020) 30 g 0     Social History     Tobacco Use     Smoking status: Never Smoker     Smokeless tobacco: Never Used   Substance Use Topics     Alcohol use: Yes     Alcohol/week: 0.0 standard drinks       ROS:  Review of Systems  Complete ROS negative except as stated above    OBJECTIVE:  /87   Pulse 56   Temp 98  F (36.7  C)   SpO2 97%   Hand exam:  examination of third finger reveals paronychia with redness, tenderness and swelling along distal finger.      assessment/plan:  (L03.012) Cellulitis of left middle finger  (primary encounter diagnosis)  Comment:   Plan: cephALEXin (KEFLEX) 500 MG capsule        Med as directed and OTC med for sx relief. Signs of spreading infection discussed and to Follow-up with PCP as needed if sx worsen or new sx develop

## 2020-07-10 ENCOUNTER — OFFICE VISIT (OUTPATIENT)
Dept: PEDIATRICS | Facility: CLINIC | Age: 60
End: 2020-07-10
Payer: COMMERCIAL

## 2020-07-10 ENCOUNTER — ANCILLARY PROCEDURE (OUTPATIENT)
Dept: MAMMOGRAPHY | Facility: CLINIC | Age: 60
End: 2020-07-10
Attending: INTERNAL MEDICINE
Payer: COMMERCIAL

## 2020-07-10 VITALS
TEMPERATURE: 98.5 F | SYSTOLIC BLOOD PRESSURE: 118 MMHG | HEIGHT: 66 IN | HEART RATE: 60 BPM | WEIGHT: 154 LBS | OXYGEN SATURATION: 99 % | BODY MASS INDEX: 24.75 KG/M2 | DIASTOLIC BLOOD PRESSURE: 79 MMHG | RESPIRATION RATE: 16 BRPM

## 2020-07-10 DIAGNOSIS — Z12.31 VISIT FOR SCREENING MAMMOGRAM: ICD-10-CM

## 2020-07-10 DIAGNOSIS — S46.812A STRAIN OF LEFT DELTOID MUSCLE, INITIAL ENCOUNTER: ICD-10-CM

## 2020-07-10 DIAGNOSIS — Z00.00 ROUTINE GENERAL MEDICAL EXAMINATION AT A HEALTH CARE FACILITY: Primary | ICD-10-CM

## 2020-07-10 PROCEDURE — 80053 COMPREHEN METABOLIC PANEL: CPT | Performed by: NURSE PRACTITIONER

## 2020-07-10 PROCEDURE — 36415 COLL VENOUS BLD VENIPUNCTURE: CPT | Performed by: NURSE PRACTITIONER

## 2020-07-10 PROCEDURE — 99396 PREV VISIT EST AGE 40-64: CPT | Performed by: NURSE PRACTITIONER

## 2020-07-10 PROCEDURE — 77067 SCR MAMMO BI INCL CAD: CPT | Mod: TC

## 2020-07-10 PROCEDURE — 80061 LIPID PANEL: CPT | Performed by: NURSE PRACTITIONER

## 2020-07-10 ASSESSMENT — MIFFLIN-ST. JEOR: SCORE: 1290.29

## 2020-07-10 NOTE — PROGRESS NOTES
SUBJECTIVE:   CC: Sury Castellanos is an 59 year old woman who presents for preventive health visit.     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     One month history of L bicep pain that comes and goes, worsening. Pain can extend from shoulder down to bicep. Hurts more with shoulder flexion and external rotation. Denies peripheral numbness, uses OTC NSAID with relief.     Today's PHQ-2 Score:   PHQ-2 ( 1999 Pfizer) 7/10/2020   Q1: Little interest or pleasure in doing things 0   Q2: Feeling down, depressed or hopeless 0   PHQ-2 Score 0   Q1: Little interest or pleasure in doing things -   Q2: Feeling down, depressed or hopeless -   PHQ-2 Score -       Abuse: Current or Past(Physical, Sexual or Emotional)- No  Do you feel safe in your environment? Yes    Have you ever done Advance Care Planning? (For example, a Health Directive, POLST, or a discussion with a medical provider or your loved ones about your wishes): Yes, patient states has an Advance Care Planning document and will bring a copy to the clinic.    Social History     Tobacco Use     Smoking status: Never Smoker     Smokeless tobacco: Never Used   Substance Use Topics     Alcohol use: Yes     Alcohol/week: 0.0 standard drinks         Alcohol Use 3/15/2019   Prescreen: >3 drinks/day or >7 drinks/week? No   Prescreen: >3 drinks/day or >7  "drinks/week? -       Reviewed orders with patient.  Reviewed health maintenance and updated orders accordingly - Yes  Lab work is in process    Mammogram Screening: Patient over age 50, mutual decision to screen reflected in health maintenance.    Pertinent mammograms are reviewed under the imaging tab.  History of abnormal Pap smear: NO - age 30-65 PAP every 5 years with negative HPV co-testing recommended  PAP / HPV Latest Ref Rng & Units 2/23/2018   PAP - NIL   HPV 16 DNA NEG:Negative Negative   HPV 18 DNA NEG:Negative Negative   OTHER HR HPV NEG:Negative Negative     Reviewed and updated as needed this visit by clinical staff  Tobacco  Allergies  Meds  Med Hx  Surg Hx  Fam Hx  Soc Hx        Reviewed and updated as needed this visit by Provider  Meds            Review of Systems  CONSTITUTIONAL: NEGATIVE for fever, chills, change in weight  INTEGUMENTARY/SKIN: NEGATIVE for worrisome rashes, moles or lesions  EYES: NEGATIVE for vision changes or irritation  ENT: NEGATIVE for ear, mouth and throat problems  RESP: NEGATIVE for significant cough or SOB  BREAST: NEGATIVE for masses, tenderness or discharge  CV: NEGATIVE for chest pain, palpitations or peripheral edema  GI: NEGATIVE for nausea, abdominal pain, heartburn, or change in bowel habits  : NEGATIVE for unusual urinary or vaginal symptoms. No vaginal bleeding.  MUSCULOSKELETAL: NEGATIVE for significant arthralgias or myalgia  NEURO: NEGATIVE for weakness, dizziness or paresthesias  PSYCHIATRIC: NEGATIVE for changes in mood or affect      OBJECTIVE:   /79   Pulse 60   Temp 98.5  F (36.9  C) (Oral)   Resp 16   Ht 1.676 m (5' 6\")   Wt 69.9 kg (154 lb)   SpO2 99%   BMI 24.86 kg/m    Physical Exam  GENERAL: healthy, alert and no distress  EYES: Eyes grossly normal to inspection, PERRL and conjunctivae and sclerae normal  HENT: ear canals and TM's normal, nose and mouth without ulcers or lesions  NECK: no adenopathy, no asymmetry, masses, or " "scars and thyroid normal to palpation  RESP: lungs clear to auscultation - no rales, rhonchi or wheezes  CV: regular rate and rhythm, normal S1 S2, no S3 or S4, no murmur, click or rub, no peripheral edema and peripheral pulses strong  ABDOMEN: soft, nontender, no hepatosplenomegaly, no masses and bowel sounds normal  MS: no gross musculoskeletal defects noted, no edema  SKIN: no suspicious lesions or rashes  PSYCH: mentation appears normal, affect normal/bright      ASSESSMENT/PLAN:   1. Routine general medical examination at a health care facility    - Comprehensive metabolic panel  - Lipid panel reflex to direct LDL Fasting  - GASTROENTEROLOGY ADULT REF PROCEDURE ONLY; Future    2. Strain of left deltoid muscle, initial encounter    - [facility] PT, HAND, AND CHIROPRACTIC REFERRAL; Future    COUNSELING:  Reviewed preventive health counseling, as reflected in patient instructions  Special attention given to:        Regular exercise       Healthy diet/nutrition       Vision screening       Hearing screening       Osteoporosis Prevention/Bone Health    Estimated body mass index is 24.86 kg/m  as calculated from the following:    Height as of this encounter: 1.676 m (5' 6\").    Weight as of this encounter: 69.9 kg (154 lb).         reports that she has never smoked. She has never used smokeless tobacco.      Counseling Resources:  ATP IV Guidelines  Pooled Cohorts Equation Calculator  Breast Cancer Risk Calculator  FRAX Risk Assessment  ICSI Preventive Guidelines  Dietary Guidelines for Americans, 2010  USDA's MyPlate  ASA Prophylaxis  Lung CA Screening    JENARO Pond [facility] [place]  "

## 2020-07-10 NOTE — LETTER
July 13, 2020      Sury Castellanos  [address] [place] [address]        Dear ,    We are writing to inform you of your test results.    Your kidney function is stable.   Your liver function is good.   Your cholesterol is great, other than your HDL (good/protective cholesterol), which is just a tiny bit low. You can increase this with regular aerobic exercise.     Resulted Orders   Comprehensive metabolic panel   Result Value Ref Range    Sodium 140 133 - 144 mmol/L    Potassium 4.1 3.4 - 5.3 mmol/L    Chloride 107 94 - 109 mmol/L    Carbon Dioxide 26 20 - 32 mmol/L    Anion Gap 7 3 - 14 mmol/L    Glucose 75 70 - 99 mg/dL      Comment:      Fasting specimen    Urea Nitrogen 17 7 - 30 mg/dL    Creatinine 0.96 0.52 - 1.04 mg/dL    GFR Estimate 64 >60 mL/min/[1.73_m2]      Comment:      Non  GFR Calc  Starting 12/18/2018, serum creatinine based estimated GFR (eGFR) will be   calculated using the Chronic Kidney Disease Epidemiology Collaboration   (CKD-EPI) equation.      GFR Estimate If Black 75 >60 mL/min/[1.73_m2]      Comment:       GFR Calc  Starting 12/18/2018, serum creatinine based estimated GFR (eGFR) will be   calculated using the Chronic Kidney Disease Epidemiology Collaboration   (CKD-EPI) equation.      Calcium 8.9 8.5 - 10.1 mg/dL    Bilirubin Total 0.6 0.2 - 1.3 mg/dL    Albumin 4.1 3.4 - 5.0 g/dL    Protein Total 7.5 6.8 - 8.8 g/dL    Alkaline Phosphatase 69 40 - 150 U/L    ALT 25 0 - 50 U/L    AST 18 0 - 45 U/L   Lipid panel reflex to direct LDL Fasting   Result Value Ref Range    Cholesterol 157 <200 mg/dL    Triglycerides 72 <150 mg/dL      Comment:      Fasting specimen    HDL Cholesterol 49 (L) >49 mg/dL    LDL Cholesterol Calculated 94 <100 mg/dL      Comment:      Desirable:       <100 mg/dl    Non HDL Cholesterol 108 <130 mg/dL       If you have any questions or concerns, please call the clinic at the number listed above.        Sincerely,        JENARO Pond CNP

## 2020-07-11 LAB
ALBUMIN SERPL-MCNC: 4.1 G/DL (ref 3.4–5)
ALP SERPL-CCNC: 69 U/L (ref 40–150)
ALT SERPL W P-5'-P-CCNC: 25 U/L (ref 0–50)
ANION GAP SERPL CALCULATED.3IONS-SCNC: 7 MMOL/L (ref 3–14)
AST SERPL W P-5'-P-CCNC: 18 U/L (ref 0–45)
BILIRUB SERPL-MCNC: 0.6 MG/DL (ref 0.2–1.3)
BUN SERPL-MCNC: 17 MG/DL (ref 7–30)
CALCIUM SERPL-MCNC: 8.9 MG/DL (ref 8.5–10.1)
CHLORIDE SERPL-SCNC: 107 MMOL/L (ref 94–109)
CHOLEST SERPL-MCNC: 157 MG/DL
CO2 SERPL-SCNC: 26 MMOL/L (ref 20–32)
CREAT SERPL-MCNC: 0.96 MG/DL (ref 0.52–1.04)
GFR SERPL CREATININE-BSD FRML MDRD: 64 ML/MIN/{1.73_M2}
GLUCOSE SERPL-MCNC: 75 MG/DL (ref 70–99)
HDLC SERPL-MCNC: 49 MG/DL
LDLC SERPL CALC-MCNC: 94 MG/DL
NONHDLC SERPL-MCNC: 108 MG/DL
POTASSIUM SERPL-SCNC: 4.1 MMOL/L (ref 3.4–5.3)
PROT SERPL-MCNC: 7.5 G/DL (ref 6.8–8.8)
SODIUM SERPL-SCNC: 140 MMOL/L (ref 133–144)
TRIGL SERPL-MCNC: 72 MG/DL

## 2020-07-13 PROBLEM — N18.2 CKD (CHRONIC KIDNEY DISEASE) STAGE 2, GFR 60-89 ML/MIN: Status: ACTIVE | Noted: 2020-07-13

## 2020-07-27 ENCOUNTER — THERAPY VISIT (OUTPATIENT)
Dept: PHYSICAL THERAPY | Facility: CLINIC | Age: 60
End: 2020-07-27
Attending: NURSE PRACTITIONER
Payer: COMMERCIAL

## 2020-07-27 DIAGNOSIS — M25.512 LEFT SHOULDER PAIN, UNSPECIFIED CHRONICITY: Primary | ICD-10-CM

## 2020-07-27 DIAGNOSIS — S46.812A STRAIN OF LEFT DELTOID MUSCLE, INITIAL ENCOUNTER: ICD-10-CM

## 2020-07-27 PROCEDURE — 97161 PT EVAL LOW COMPLEX 20 MIN: CPT | Mod: GP | Performed by: PHYSICAL THERAPIST

## 2020-07-27 PROCEDURE — 97110 THERAPEUTIC EXERCISES: CPT | Mod: GP | Performed by: PHYSICAL THERAPIST

## 2020-07-27 NOTE — PROGRESS NOTES
[facility] Initial Evaluation  Subjective:  The history is provided by the patient. No  was used.   Patient Health History  Sury Castellanos being seen for L shoulder strain.     Problem began: 6/27/2020.   Problem occurred: not sure   Pain is reported as 4/10 on pain scale.  General health as reported by patient is excellent.  Pertinent medical history includes: none.   Red flags:  None as reported by patient.  Medical allergies: none.   Surgeries include:  None.    Current medications:  None.    Current occupation is Admin.                     Therapist Generated HPI Evaluation         Type of problem:  Left shoulder.    This is a new condition.  Condition occurred with:  Unknown cause (L shoulder pain lateral shoulder, ).    Patient reports pain:  Lateral.        Symptoms are exacerbated by lifting, carrying, lying on extremity, certain positions and using arm overhead  and relieved by analgesics and NSAID's.                              Objective:  Standing Alignment:      Shoulder/UE:  Rounded shoulders                                       Shoulder Evaluation:  ROM:  AROM:    Flexion:  Left:  Min loss        Abduction:  Left: PDM         External Rotation:  Left:  Min loss                      Pain: ERP with L shoulder Flex, Mid range Abd, slight pain with Ext/IR    Strength:    Flexion: Left:5-/5   Pain:    Right: 5/5     Pain:   Extension:  Left: 4/5    Pain:    Right: 5-/5    Pain:  Abduction:  Left: 4/5  Pain:    Right: 5-/5     Pain:    Internal Rotation:  Left:5-/5     Pain:    Right: 5/5     Pain:  External Rotation:   Left:5-/5     Pain:   Right:5/5     Pain:    Horizontal Abduction:  Left:4-/5     Pain:    Right:5-/5    Pain:                                                 General     ROS    Assessment/Plan:    Patient is a 59 year old female with left side shoulder complaints.    Patient has the following significant findings with corresponding treatment  plan.                Diagnosis 1:  L shoulder pain limited full ROM and strength  Pain -  hot/cold therapy, manual therapy, self management, education and home program  Decreased ROM/flexibility - manual therapy and therapeutic exercise  Decreased strength - therapeutic exercise and therapeutic activities  Inflammation - cold therapy  Decreased function - therapeutic activities    Previous and current functional limitations:  (See Goal Flow Sheet for this information)    Short term and Long term goals: (See Goal Flow Sheet for this information)     Communication ability:  Patient appears to be able to clearly communicate and understand verbal and written communication and follow directions correctly.  Treatment Explanation - The following has been discussed with the patient:   RX ordered/plan of care  Anticipated outcomes  Possible risks and side effects  This patient would benefit from PT intervention to resume normal activities.   Rehab potential is good.    Frequency:  1 X week, once daily  Duration:  for 4-6 weeks  Discharge Plan:  Achieve all LTG.  Independent in home treatment program.  Reach maximal therapeutic benefit.    Please refer to the daily flowsheet for treatment today, total treatment time and time spent performing 1:1 timed codes.

## 2020-08-04 ENCOUNTER — THERAPY VISIT (OUTPATIENT)
Dept: PHYSICAL THERAPY | Facility: CLINIC | Age: 60
End: 2020-08-04
Attending: NURSE PRACTITIONER
Payer: COMMERCIAL

## 2020-08-04 DIAGNOSIS — S46.812A STRAIN OF LEFT DELTOID MUSCLE, INITIAL ENCOUNTER: ICD-10-CM

## 2020-08-04 DIAGNOSIS — M25.512 LEFT SHOULDER PAIN, UNSPECIFIED CHRONICITY: ICD-10-CM

## 2020-08-04 PROCEDURE — 97110 THERAPEUTIC EXERCISES: CPT | Mod: GP | Performed by: PHYSICAL THERAPIST

## 2020-08-04 PROCEDURE — 97140 MANUAL THERAPY 1/> REGIONS: CPT | Mod: GP | Performed by: PHYSICAL THERAPIST

## 2020-08-11 ENCOUNTER — THERAPY VISIT (OUTPATIENT)
Dept: PHYSICAL THERAPY | Facility: CLINIC | Age: 60
End: 2020-08-11
Payer: COMMERCIAL

## 2020-08-11 DIAGNOSIS — S46.812A STRAIN OF LEFT DELTOID MUSCLE, INITIAL ENCOUNTER: ICD-10-CM

## 2020-08-11 DIAGNOSIS — M25.512 LEFT SHOULDER PAIN, UNSPECIFIED CHRONICITY: ICD-10-CM

## 2020-08-11 PROCEDURE — 97140 MANUAL THERAPY 1/> REGIONS: CPT | Mod: GP | Performed by: PHYSICAL THERAPIST

## 2020-08-11 PROCEDURE — 97110 THERAPEUTIC EXERCISES: CPT | Mod: GP | Performed by: PHYSICAL THERAPIST

## 2020-08-27 ENCOUNTER — THERAPY VISIT (OUTPATIENT)
Dept: PHYSICAL THERAPY | Facility: CLINIC | Age: 60
End: 2020-08-27
Payer: COMMERCIAL

## 2020-08-27 DIAGNOSIS — M25.512 LEFT SHOULDER PAIN, UNSPECIFIED CHRONICITY: ICD-10-CM

## 2020-08-27 DIAGNOSIS — S46.812A STRAIN OF LEFT DELTOID MUSCLE, INITIAL ENCOUNTER: ICD-10-CM

## 2020-08-27 PROCEDURE — 97110 THERAPEUTIC EXERCISES: CPT | Mod: GP | Performed by: PHYSICAL THERAPIST

## 2020-08-27 PROCEDURE — 97140 MANUAL THERAPY 1/> REGIONS: CPT | Mod: GP | Performed by: PHYSICAL THERAPIST

## 2020-08-31 ENCOUNTER — THERAPY VISIT (OUTPATIENT)
Dept: PHYSICAL THERAPY | Facility: CLINIC | Age: 60
End: 2020-08-31
Payer: COMMERCIAL

## 2020-08-31 DIAGNOSIS — S46.812A STRAIN OF LEFT DELTOID MUSCLE, INITIAL ENCOUNTER: ICD-10-CM

## 2020-08-31 DIAGNOSIS — M25.512 LEFT SHOULDER PAIN, UNSPECIFIED CHRONICITY: ICD-10-CM

## 2020-08-31 PROCEDURE — 97140 MANUAL THERAPY 1/> REGIONS: CPT | Mod: GP | Performed by: PHYSICAL THERAPIST

## 2020-08-31 PROCEDURE — 97110 THERAPEUTIC EXERCISES: CPT | Mod: GP | Performed by: PHYSICAL THERAPIST

## 2020-08-31 NOTE — PROGRESS NOTES
Subjective:  HPI  Physical Exam                    Objective:  System    Physical Exam    General     ROS    Assessment/Plan:    DISCHARGE REPORT    Progress reporting period is from 7/27/2020 to 8/31/2020.       SUBJECTIVE  Subjective: Pt notes strengthenging is a challenge. Stretching daily. Tylenol as needed. Icing as needed. Dressing and bathing much improved from beginning PT (better ROM and less pain).     Current Pain level: 1/10.     Previous pain level was  6/10  .   Changes in function:  Yes (See Goal flowsheet attached for changes in current functional level)  Adverse reaction to treatment or activity: None    OBJECTIVE  Changes noted in objective findings:  Yes,   Objective: AROM: L shoulder flexion-145; abd-165; ER-55; Ext/IR/add=T8.  MMT some discomfort with flexion, no pain with ER/IR. Tolerated mobs well agian today. Pt will continue with HEP.      ASSESSMENT/PLAN  Updated problem list and treatment plan: Diagnosis 1:  L shoulder pain, adhesive capsulitis  STG/LTGs have been met or progress has been made towards goals:  Yes (See Goal flow sheet completed today.)  Assessment of Progress: The patient's condition is improving.  Self Management Plans:  Patient has been instructed in a home treatment program.  I have re-evaluated this patient and find that the nature, scope, duration and intensity of the therapy is appropriate for the medical condition of the patient.  Sury continues to require the following intervention to meet STG and LTG's:  PT intervention is no longer required to meet STG/LTG.    Recommendations:  This patient is ready to be discharged from therapy and continue their home treatment program.    Please refer to the daily flowsheet for treatment today, total treatment time and time spent performing 1:1 timed codes.

## 2021-08-02 ENCOUNTER — OFFICE VISIT (OUTPATIENT)
Dept: PEDIATRICS | Facility: CLINIC | Age: 61
End: 2021-08-02
Payer: COMMERCIAL

## 2021-08-02 ENCOUNTER — ANCILLARY PROCEDURE (OUTPATIENT)
Dept: MAMMOGRAPHY | Facility: CLINIC | Age: 61
End: 2021-08-02
Payer: COMMERCIAL

## 2021-08-02 VITALS
HEIGHT: 67 IN | BODY MASS INDEX: 25.63 KG/M2 | WEIGHT: 163.3 LBS | HEART RATE: 75 BPM | TEMPERATURE: 98.3 F | SYSTOLIC BLOOD PRESSURE: 98 MMHG | DIASTOLIC BLOOD PRESSURE: 64 MMHG | OXYGEN SATURATION: 97 % | RESPIRATION RATE: 20 BRPM

## 2021-08-02 DIAGNOSIS — Z12.11 ENCOUNTER FOR SCREENING FOR MALIGNANT NEOPLASM OF COLON: ICD-10-CM

## 2021-08-02 DIAGNOSIS — Z00.00 ROUTINE GENERAL MEDICAL EXAMINATION AT A HEALTH CARE FACILITY: Primary | ICD-10-CM

## 2021-08-02 DIAGNOSIS — D22.9 ATYPICAL NEVUS: ICD-10-CM

## 2021-08-02 DIAGNOSIS — Z80.3 FAMILY HISTORY OF MALIGNANT NEOPLASM OF BREAST: ICD-10-CM

## 2021-08-02 DIAGNOSIS — Z12.31 VISIT FOR SCREENING MAMMOGRAM: ICD-10-CM

## 2021-08-02 PROCEDURE — 99396 PREV VISIT EST AGE 40-64: CPT | Performed by: NURSE PRACTITIONER

## 2021-08-02 PROCEDURE — 77067 SCR MAMMO BI INCL CAD: CPT | Mod: TC | Performed by: RADIOLOGY

## 2021-08-02 PROCEDURE — 77063 BREAST TOMOSYNTHESIS BI: CPT | Mod: TC | Performed by: RADIOLOGY

## 2021-08-02 ASSESSMENT — ENCOUNTER SYMPTOMS
DIZZINESS: 0
EYE PAIN: 0
FREQUENCY: 0
WEAKNESS: 0
ARTHRALGIAS: 0
MYALGIAS: 0
NERVOUS/ANXIOUS: 0
DYSURIA: 0
CONSTIPATION: 0
PARESTHESIAS: 0
HEADACHES: 0
HEMATOCHEZIA: 0
SORE THROAT: 0
HEMATURIA: 0
SHORTNESS OF BREATH: 0
DIARRHEA: 0
FEVER: 0
CHILLS: 0
NAUSEA: 0
ABDOMINAL PAIN: 0
HEARTBURN: 0
JOINT SWELLING: 0
COUGH: 0
PALPITATIONS: 0

## 2021-08-02 ASSESSMENT — MIFFLIN-ST. JEOR: SCORE: 1343.35

## 2021-08-02 NOTE — PROGRESS NOTES
SUBJECTIVE:   CC: Sury Castellanos is an 60 year old woman who presents for preventive health visit.     Patient has been advised of split billing requirements and indicates understanding: Yes     Healthy Habits:     Getting at least 3 servings of Calcium per day:  Yes    Bi-annual eye exam:  Yes    Dental care twice a year:  Yes    Sleep apnea or symptoms of sleep apnea:  None    Diet:  Regular (no restrictions)    Frequency of exercise:  4-5 days/week    Duration of exercise:  45-60 minutes    Taking medications regularly:  Yes    Medication side effects:  None    PHQ-2 Total Score: 0    Additional concerns today:  No    Pap - utd  Mammo - utd  Colonoscopy - due      Pt went last week to [facility] - respiratory cold, on vacation near [employer]. Was outdoors a lot. Had neg covid19 test - taking flonase. Did fill the Rx that was given to her - Doxycycline. Has 2x more days left of this. Feeling better for the most part. Doesn't feel so fatigued.      Today's PHQ-2 Score:   PHQ-2 ( 1999 Pfizer) 8/2/2021   Q1: Little interest or pleasure in doing things 0   Q2: Feeling down, depressed or hopeless 0   PHQ-2 Score 0   Q1: Little interest or pleasure in doing things Not at all   Q2: Feeling down, depressed or hopeless Not at all   PHQ-2 Score 0       Abuse: Current or Past (Physical, Sexual or Emotional) - No  Do you feel safe in your environment? Yes      Social History     Tobacco Use     Smoking status: Never Smoker     Smokeless tobacco: Never Used   Substance Use Topics     Alcohol use: Yes     Alcohol/week: 0.0 standard drinks     If you drink alcohol do you typically have >3 drinks per day or >7 drinks per week? No    Alcohol Use 3/15/2019   Prescreen: >3 drinks/day or >7 drinks/week? No   Prescreen: >3 drinks/day or >7 drinks/week? -       Reviewed orders with patient.  Reviewed health maintenance and updated orders accordingly - Yes  BP Readings from Last 3 Encounters:   08/02/21 98/64   07/10/20  118/79   03/07/20 129/87    Wt Readings from Last 3 Encounters:   08/02/21 74.1 kg (163 lb 4.8 oz)   07/10/20 69.9 kg (154 lb)   03/15/19 74.4 kg (164 lb 1.6 oz)         Breast Cancer Screening:  Any new diagnosis of family breast, ovarian, or bowel cancer? No    FHS-7:   Breast CA Risk Assessment (FHS-7) 8/2/2021   Did any of your first-degree relatives have breast or ovarian cancer? Yes   Did any of your relatives have bilateral breast cancer? No   Did any man in your family have breast cancer? No   Did any woman in your family have breast and ovarian cancer? No   Did any woman in your family have breast cancer before age 50 y? No   Do you have 2 or more relatives with breast and/or ovarian cancer? No   Do you have 2 or more relatives with breast and/or bowel cancer? No       Mammogram Screening: Recommended mammography every 1-2 years with patient discussion and risk factor consideration  Pertinent mammograms are reviewed under the imaging tab.    History of abnormal Pap smear: NO - age 30-65 PAP every 5 years with negative HPV co-testing recommended  PAP / HPV Latest Ref Rng & Units 2/23/2018   PAP (Historical) - NIL   HPV16 NEG:Negative Negative   HPV18 NEG:Negative Negative   HRHPV NEG:Negative Negative     Reviewed and updated as needed this visit by clinical staff                 Reviewed and updated as needed this visit by Provider                Patient Active Problem List   Diagnosis     Family history of malignant neoplasm of breast     Eczema, unspecified type     CKD (chronic kidney disease) stage 2, GFR 60-89 ml/min     Past Medical History:   Diagnosis Date     Eczema, unspecified type 2/23/2018     Past Surgical History:   Procedure Laterality Date     GYN SURGERY      uterine polyp removal     Family History   Problem Relation Age of Onset     Lung Cancer Father         smoker     Breast Cancer Mother 58     Breast Cancer Sister 40     Social History     Socioeconomic History     Marital status:       Spouse name: Not on file     Number of children: Not on file     Years of education: Not on file     Highest education level: Not on file   Occupational History     Not on file   Tobacco Use     Smoking status: Never Smoker     Smokeless tobacco: Never Used   Substance and Sexual Activity     Alcohol use: Yes     Alcohol/week: 0.0 standard drinks     Drug use: No     Sexual activity: Not Currently   Other Topics Concern     Parent/sibling w/ CABG, MI or angioplasty before 65F 55M? Not Asked   Social History Narrative     Not on file     Social Determinants of Health     Financial Resource Strain:      Difficulty of Paying Living Expenses:    Food Insecurity:      Worried About Running Out of Food in the Last Year:      Ran Out of Food in the Last Year:    Transportation Needs:      Lack of Transportation (Medical):      Lack of Transportation (Non-Medical):    Physical Activity:      Days of Exercise per Week:      Minutes of Exercise per Session:    Stress:      Feeling of Stress :    Social Connections:      Frequency of Communication with Friends and Family:      Frequency of Social Gatherings with Friends and Family:      Attends Worship Services:      Active Member of Clubs or Organizations:      Attends Club or Organization Meetings:      Marital Status:    Intimate Partner Violence:      Fear of Current or Ex-Partner:      Emotionally Abused:      Physically Abused:      Sexually Abused:      No current outpatient medications on file.     No current facility-administered medications for this visit.        Allergies   Allergen Reactions     Penicillins          Review of Systems   Constitutional: Negative for chills and fever.   HENT: Negative for congestion, ear pain, hearing loss and sore throat.    Eyes: Negative for pain and visual disturbance.   Respiratory: Negative for cough and shortness of breath.    Cardiovascular: Negative for chest pain, palpitations and peripheral edema.  "  Gastrointestinal: Negative for abdominal pain, constipation, diarrhea, heartburn, hematochezia and nausea.   Genitourinary: Negative for dysuria, frequency, genital sores, hematuria and urgency.   Musculoskeletal: Negative for arthralgias, joint swelling and myalgias.   Skin: Negative for rash.   Neurological: Negative for dizziness, weakness, headaches and paresthesias.   Psychiatric/Behavioral: Negative for mood changes. The patient is not nervous/anxious.      OBJECTIVE:   BP 98/64 (BP Location: Right arm, Patient Position: Sitting, Cuff Size: Adult Regular)   Pulse 75   Temp 98.3  F (36.8  C) (Tympanic)   Resp 20   Ht 1.702 m (5' 7\")   Wt 74.1 kg (163 lb 4.8 oz)   SpO2 97%   BMI 25.58 kg/m    Physical Exam  Constitutional: appears to be in no acute distress, comfortable, pleasant.   Eyes: anicteric, conjunctiva clear without drainage or erythema. HENRY.   Ears, Nose and Throat: tympanic membranes gray with LR,  nose without nasal discharge. OP: no erythema to posterior pharynx, negative post nasal drainage, tonsils +1 no erythema or exudate.  Neck: supple, thyroid palpable,not enlarged, no nodules   Breast: Exam deferred (deferred after discussion of exam options with patient, no symptoms or concerns).   Cardiovascular: regular rate and rhythm, normal S1 and S2, no murmurs, rubs or gallops, peripheral pulses full and symmetric; negative peripheral edema   Respiratory: Air entry throughout. Breathing pattern unlabored without the use of accessory muscles. Clear to auscultation A and P, no wheezes or crackles, normal breath sounds.    Gastrointestinal: rounded, soft. Positive bowel sounds x4, nontender, no masses.   Genitourinary: Exam deferred (deferred after discussion of exam options with patient, no symptoms or concerns).   Musculoskeletal: full range of motion, no edema.   Skin: pink, turgor smooth and elastic. Negative for lesions or dryness. One atypical nevous on left middle back.   Neurological: " "normal gait, no tremor.   Psychological: appropriate mood and affect.   Lymphatic: no cervical, axillary, supraclavicular, or infraclavicular lymphadenopathy.    Diagnostic Test Results:  Labs reviewed in Epic    ASSESSMENT/PLAN:   1. Routine general medical examination at a health care facility  Age appropriate screening and preventative care have been addressed today. Vaccinations have been reviewed. Patient has been advised to undertake routine aerobic activity and they were counseled on healthy weight maintenance. Recommend annual vision exams as well as biannual dental exams. They will follow up for annual physical again in one year.   - Lipid panel reflex to direct LDL Fasting; Future  - Comprehensive metabolic panel (BMP + Alb, Alk Phos, ALT, AST, Total. Bili, TP); Future  - REVIEW OF HEALTH MAINTENANCE PROTOCOL ORDERS  - Will obtain Shingrix vaccine in pharmacy  - Discussed recommended daily intake of calcium and vitamin D    2. Encounter for screening for malignant neoplasm of colon  - Adult Gastro Ref - Procedure Only; Future    3. Atypical nevus  - Adult Dermatology Referral; Future    4. Family history of malignant neoplasm of breast  Family history significant for breast cancer in her mother (diagnosed age 58) and sister (diagnosed in early 40s). Offered genetic counseling, which she will consider and let me know if she decides to pursue.      Follow-up: Lab results pending, will follow-up as indicated after reviewing results.     COUNSELING:  Reviewed preventive health counseling, as reflected in patient instructions  Special attention given to:        Regular exercise       Healthy diet/nutrition       Vision screening       Immunizations       Osteoporosis prevention/bone health       Colon cancer screening    Estimated body mass index is 25.58 kg/m  as calculated from the following:    Height as of this encounter: 1.702 m (5' 7\").    Weight as of this encounter: 74.1 kg (163 lb 4.8 oz).        She " reports that she has never smoked. She has never used smokeless tobacco.      Counseling Resources:  ATP IV Guidelines  Pooled Cohorts Equation Calculator  Breast Cancer Risk Calculator  BRCA-Related Cancer Risk Assessment: FHS-7 Tool  FRAX Risk Assessment  ICSI Preventive Guidelines  Dietary Guidelines for Americans, 2010  USDA's MyPlate  ASA Prophylaxis  Lung CA Screening    JENARO Kraft CNP  [facility]

## 2021-08-02 NOTE — PROGRESS NOTES
"   SUBJECTIVE:   CC: Sury Castellanos is an 60 year old woman who presents for preventive health visit.   Patient has been advised of split billing requirements and indicates understanding: Yes     HPI      Today's PHQ-2 Score:   PHQ-2 ( 1999 Pfizer) 7/10/2020   Q1: Little interest or pleasure in doing things 0   Q2: Feeling down, depressed or hopeless 0   PHQ-2 Score 0   Q1: Little interest or pleasure in doing things -   Q2: Feeling down, depressed or hopeless -   PHQ-2 Score -       Abuse: Current or Past (Physical, Sexual or Emotional) - { :814126}  Do you feel safe in your environment? { :928257}      Social History     Tobacco Use     Smoking status: Never Smoker     Smokeless tobacco: Never Used   Substance Use Topics     Alcohol use: Yes     Alcohol/week: 0.0 standard drinks         Alcohol Use 3/15/2019   Prescreen: >3 drinks/day or >7 drinks/week? No   Prescreen: >3 drinks/day or >7 drinks/week? -       Reviewed orders with patient.  Reviewed health maintenance and updated orders accordingly - { :079955::\"Yes\"}  {Chronicprobdata (optional):765113}    Breast Cancer Screening:  Any new diagnosis of family breast, ovarian, or bowel cancer? {Yes_Link to Screening / No:986219}    FHS-7:   Breast CA Risk Assessment (FHS-7) 8/2/2021   Did any of your first-degree relatives have breast or ovarian cancer? Yes   Did any of your relatives have bilateral breast cancer? No   Did any man in your family have breast cancer? No   Did any woman in your family have breast and ovarian cancer? No   Did any woman in your family have breast cancer before age 50 y? No   Do you have 2 or more relatives with breast and/or ovarian cancer? No   Do you have 2 or more relatives with breast and/or bowel cancer? No     {If any of the questions to the BCRA (FHS-7) are answered yes, consider ordering referral for genetic counseling (Optional) :832182::\"click delete button to remove this line now\"}  {AMB Mammogram Decision Support " "(Optional) :973953}  Pertinent mammograms are reviewed under the imaging tab.    History of abnormal Pap smear: { :657651}  PAP / HPV Latest Ref Rng & Units 2/23/2018   PAP (Historical) - NIL   HPV16 NEG:Negative Negative   HPV18 NEG:Negative Negative   HRHPV NEG:Negative Negative     Reviewed and updated as needed this visit by clinical staff                 Reviewed and updated as needed this visit by Provider                {HISTORY OPTIONS (Optional):923532}    Review of Systems  {FEMALE ROS (Optional):779777}     OBJECTIVE:   There were no vitals taken for this visit.  Physical Exam  {Exam Choices (Optional):936666}    {Diagnostic Test Results (Optional):385809::\"Diagnostic Test Results:\",\"Labs reviewed in Epic\"}    ASSESSMENT/PLAN:   {Diag Picklist:233091}    Patient has been advised of split billing requirements and indicates understanding: {YES / NO:863025::\"Yes\"}  COUNSELING:  {FEMALE COUNSELING MESSAGES:167574::\"Reviewed preventive health counseling, as reflected in patient instructions\"}    Estimated body mass index is 24.86 kg/m  as calculated from the following:    Height as of 7/10/20: 1.676 m (5' 6\").    Weight as of 7/10/20: 69.9 kg (154 lb).    {Weight Management Plan (ACO) Complete if BMI is abnormal-  Ages 18-64  BMI >24.9.  Age 65+ with BMI <23 or >30 (Optional):681091}    She reports that she has never smoked. She has never used smokeless tobacco.      Counseling Resources:  ATP IV Guidelines  Pooled Cohorts Equation Calculator  Breast Cancer Risk Calculator  BRCA-Related Cancer Risk Assessment: FHS-7 Tool  FRAX Risk Assessment  ICSI Preventive Guidelines  Dietary Guidelines for Americans, 2010  USDA's MyPlate  ASA Prophylaxis  Lung CA Screening    JENARO Kraft [facility]  "

## 2021-08-02 NOTE — PATIENT INSTRUCTIONS
Preventive Health Recommendations  Female Ages 50 - 64    Yearly exam: See your health care provider every year in order to  o Review health changes.   o Discuss preventive care.    o Review your medicines if your doctor has prescribed any.      Get a Pap test every three years (unless you have an abnormal result and your provider advises testing more often).    If you get Pap tests with HPV test, you only need to test every 5 years, unless you have an abnormal result.     You do not need a Pap test if your uterus was removed (hysterectomy) and you have not had cancer.    You should be tested each year for STDs (sexually transmitted diseases) if you're at risk.     Have a mammogram every 1 to 2 years.    Have a colonoscopy at age 50, or have a yearly FIT test (stool test). These exams screen for colon cancer.      Have a cholesterol test every 5 years, or more often if advised.    Have a diabetes test (fasting glucose) every three years. If you are at risk for diabetes, you should have this test more often.     If you are at risk for osteoporosis (brittle bone disease), think about having a bone density scan (DEXA).    Shots: Get a flu shot each year. Get a tetanus shot every 10 years.    Nutrition:     Eat at least 5 servings of fruits and vegetables each day.    Eat whole-grain bread, whole-wheat pasta and brown rice instead of white grains and rice.    Get adequate Calcium and Vitamin D.     Lifestyle    Exercise at least 150 minutes a week (30 minutes a day, 5 days a week). This will help you control your weight and prevent disease.    Limit alcohol to one drink per day.    No smoking.     Wear sunscreen to prevent skin cancer.     See your dentist every six months for an exam and cleaning.    See your eye doctor every 1 to 2 years.    Preventive Health Recommendations  Female Ages 50 - 64    Yearly exam: See your health care provider every year in order to  o Review health changes.   o Discuss preventive  care.    o Review your medicines if your doctor has prescribed any.      Get a Pap test every three years (unless you have an abnormal result and your provider advises testing more often).    If you get Pap tests with HPV test, you only need to test every 5 years, unless you have an abnormal result.     You do not need a Pap test if your uterus was removed (hysterectomy) and you have not had cancer.    You should be tested each year for STDs (sexually transmitted diseases) if you're at risk.     Have a mammogram every 1 to 2 years.    Have a colonoscopy at age 50, or have a yearly FIT test (stool test). These exams screen for colon cancer.      Have a cholesterol test every 5 years, or more often if advised.    Have a diabetes test (fasting glucose) every three years. If you are at risk for diabetes, you should have this test more often.     If you are at risk for osteoporosis (brittle bone disease), think about having a bone density scan (DEXA).    Shots: Get a flu shot each year. Get a tetanus shot every 10 years.    Nutrition:     Eat at least 5 servings of fruits and vegetables each day.    Eat whole-grain bread, whole-wheat pasta and brown rice instead of white grains and rice.    Get adequate Calcium and Vitamin D.     Lifestyle    Exercise at least 150 minutes a week (30 minutes a day, 5 days a week). This will help you control your weight and prevent disease.    Limit alcohol to one drink per day.    No smoking.     Wear sunscreen to prevent skin cancer.     See your dentist every six months for an exam and cleaning.    See your eye doctor every 1 to 2 years.    Adequate calcium is necessary for good health, and not just because it's a major component of our bones. It also plays a vital role in keeping our organs and skeletal muscles working properly.    Calcium -- A rough method of estimating dietary calcium intake is to multiply the number of dairy servings consumed per day by 300 mg. One serving is 8 oz  (240 mL) of milk or yogurt or 1 oz (29 g) of hard cheese. Cottage cheese and ice cream contain approximately 150 mg of calcium per 4 oz (113 g). Other foods in a well-balanced diet (dark green vegetables, some nuts, breads, and cereals) supply an average of 100 to 200 mg of calcium daily. Some cereals, soy products, and fruit juices are fortified with up to 1000 mg of calcium.    Recommended amount of daily calcium: For post-menopausal women: 1,000-1,200 milligrams of calcium per day.    Formulations:  The most widely available calcium supplements are calcium carbonate and Calcium citrate. Calcium carbonate is cheapest and therefore often a good first choice. However, there are some limitations to its use compared with calcium citrate:  ?Calcium carbonate absorption is better when taken with meals; in comparison, calcium citrate as well absorbed in the fasting state and is equally absorbed compared with calcium carbonate taken with a meal. This may be particularly important in patients with achlorhydria. Thus, it seems prudent to take calcium carbonate with meals since it is often hard to know who has achlorhydria.  ?Calcium carbonate is also poorly absorbed in patients taking proton pump inhibitors (PPIs) or H2 blockers. We usually recommend calcium citrate as a first-line calcium supplement in these patients.

## 2021-08-07 ENCOUNTER — LAB (OUTPATIENT)
Dept: LAB | Facility: CLINIC | Age: 61
End: 2021-08-07
Payer: COMMERCIAL

## 2021-08-07 DIAGNOSIS — Z00.00 ROUTINE GENERAL MEDICAL EXAMINATION AT A HEALTH CARE FACILITY: ICD-10-CM

## 2021-08-07 LAB
ALBUMIN SERPL-MCNC: 4 G/DL (ref 3.4–5)
ALP SERPL-CCNC: 75 U/L (ref 40–150)
ALT SERPL W P-5'-P-CCNC: 33 U/L (ref 0–50)
ANION GAP SERPL CALCULATED.3IONS-SCNC: 1 MMOL/L (ref 3–14)
AST SERPL W P-5'-P-CCNC: 20 U/L (ref 0–45)
BILIRUB SERPL-MCNC: 0.6 MG/DL (ref 0.2–1.3)
BUN SERPL-MCNC: 17 MG/DL (ref 7–30)
CALCIUM SERPL-MCNC: 9.1 MG/DL (ref 8.5–10.1)
CHLORIDE BLD-SCNC: 108 MMOL/L (ref 94–109)
CHOLEST SERPL-MCNC: 181 MG/DL
CO2 SERPL-SCNC: 28 MMOL/L (ref 20–32)
CREAT SERPL-MCNC: 0.94 MG/DL (ref 0.52–1.04)
FASTING STATUS PATIENT QL REPORTED: ABNORMAL
GFR SERPL CREATININE-BSD FRML MDRD: 66 ML/MIN/1.73M2
GLUCOSE BLD-MCNC: 96 MG/DL (ref 70–99)
HDLC SERPL-MCNC: 55 MG/DL
LDLC SERPL CALC-MCNC: 104 MG/DL
NONHDLC SERPL-MCNC: 126 MG/DL
POTASSIUM BLD-SCNC: 4.3 MMOL/L (ref 3.4–5.3)
PROT SERPL-MCNC: 7.4 G/DL (ref 6.8–8.8)
SODIUM SERPL-SCNC: 137 MMOL/L (ref 133–144)
TRIGL SERPL-MCNC: 110 MG/DL

## 2021-08-07 PROCEDURE — 80061 LIPID PANEL: CPT

## 2021-08-07 PROCEDURE — 36415 COLL VENOUS BLD VENIPUNCTURE: CPT

## 2021-08-07 PROCEDURE — 80053 COMPREHEN METABOLIC PANEL: CPT

## 2021-09-04 ENCOUNTER — HEALTH MAINTENANCE LETTER (OUTPATIENT)
Age: 61
End: 2021-09-04

## 2022-05-05 ENCOUNTER — TRANSFERRED RECORDS (OUTPATIENT)
Dept: HEALTH INFORMATION MANAGEMENT | Facility: CLINIC | Age: 62
End: 2022-05-05
Payer: COMMERCIAL

## 2022-09-27 SDOH — HEALTH STABILITY: PHYSICAL HEALTH: ON AVERAGE, HOW MANY DAYS PER WEEK DO YOU ENGAGE IN MODERATE TO STRENUOUS EXERCISE (LIKE A BRISK WALK)?: 5 DAYS

## 2022-09-27 SDOH — ECONOMIC STABILITY: FOOD INSECURITY: WITHIN THE PAST 12 MONTHS, YOU WORRIED THAT YOUR FOOD WOULD RUN OUT BEFORE YOU GOT MONEY TO BUY MORE.: NEVER TRUE

## 2022-09-27 SDOH — HEALTH STABILITY: PHYSICAL HEALTH: ON AVERAGE, HOW MANY MINUTES DO YOU ENGAGE IN EXERCISE AT THIS LEVEL?: 60 MIN

## 2022-09-27 SDOH — ECONOMIC STABILITY: TRANSPORTATION INSECURITY
IN THE PAST 12 MONTHS, HAS THE LACK OF TRANSPORTATION KEPT YOU FROM MEDICAL APPOINTMENTS OR FROM GETTING MEDICATIONS?: NO

## 2022-09-27 SDOH — ECONOMIC STABILITY: TRANSPORTATION INSECURITY
IN THE PAST 12 MONTHS, HAS LACK OF TRANSPORTATION KEPT YOU FROM MEETINGS, WORK, OR FROM GETTING THINGS NEEDED FOR DAILY LIVING?: NO

## 2022-09-27 SDOH — ECONOMIC STABILITY: FOOD INSECURITY: WITHIN THE PAST 12 MONTHS, THE FOOD YOU BOUGHT JUST DIDN'T LAST AND YOU DIDN'T HAVE MONEY TO GET MORE.: NEVER TRUE

## 2022-09-27 SDOH — ECONOMIC STABILITY: INCOME INSECURITY: IN THE LAST 12 MONTHS, WAS THERE A TIME WHEN YOU WERE NOT ABLE TO PAY THE MORTGAGE OR RENT ON TIME?: NO

## 2022-09-27 SDOH — ECONOMIC STABILITY: INCOME INSECURITY: HOW HARD IS IT FOR YOU TO PAY FOR THE VERY BASICS LIKE FOOD, HOUSING, MEDICAL CARE, AND HEATING?: NOT HARD AT ALL

## 2022-09-27 ASSESSMENT — ENCOUNTER SYMPTOMS
ARTHRALGIAS: 1
BREAST MASS: 0

## 2022-09-27 ASSESSMENT — LIFESTYLE VARIABLES
HOW OFTEN DO YOU HAVE SIX OR MORE DRINKS ON ONE OCCASION: NEVER
HOW MANY STANDARD DRINKS CONTAINING ALCOHOL DO YOU HAVE ON A TYPICAL DAY: PATIENT DOES NOT DRINK
HOW OFTEN DO YOU HAVE A DRINK CONTAINING ALCOHOL: 2-4 TIMES A MONTH
AUDIT-C TOTAL SCORE: 2
SKIP TO QUESTIONS 9-10: 1

## 2022-09-27 ASSESSMENT — SOCIAL DETERMINANTS OF HEALTH (SDOH)
HOW OFTEN DO YOU GET TOGETHER WITH FRIENDS OR RELATIVES?: MORE THAN THREE TIMES A WEEK
HOW OFTEN DO YOU ATTEND CHURCH OR RELIGIOUS SERVICES?: 1 TO 4 TIMES PER YEAR
IN A TYPICAL WEEK, HOW MANY TIMES DO YOU TALK ON THE PHONE WITH FAMILY, FRIENDS, OR NEIGHBORS?: MORE THAN THREE TIMES A WEEK
DO YOU BELONG TO ANY CLUBS OR ORGANIZATIONS SUCH AS CHURCH GROUPS UNIONS, FRATERNAL OR ATHLETIC GROUPS, OR SCHOOL GROUPS?: YES

## 2022-09-28 ENCOUNTER — OFFICE VISIT (OUTPATIENT)
Dept: PEDIATRICS | Facility: CLINIC | Age: 62
End: 2022-09-28

## 2022-09-28 ENCOUNTER — ANCILLARY PROCEDURE (OUTPATIENT)
Dept: MAMMOGRAPHY | Facility: CLINIC | Age: 62
End: 2022-09-28
Attending: NURSE PRACTITIONER
Payer: COMMERCIAL

## 2022-09-28 VITALS
TEMPERATURE: 97.3 F | BODY MASS INDEX: 26.36 KG/M2 | SYSTOLIC BLOOD PRESSURE: 108 MMHG | DIASTOLIC BLOOD PRESSURE: 78 MMHG | WEIGHT: 164 LBS | OXYGEN SATURATION: 98 % | HEART RATE: 64 BPM | HEIGHT: 66 IN | RESPIRATION RATE: 16 BRPM

## 2022-09-28 DIAGNOSIS — G89.29 CHRONIC LEFT SHOULDER PAIN: ICD-10-CM

## 2022-09-28 DIAGNOSIS — Z12.4 ENCOUNTER FOR SCREENING FOR CERVICAL CANCER: ICD-10-CM

## 2022-09-28 DIAGNOSIS — Z00.00 ROUTINE GENERAL MEDICAL EXAMINATION AT A HEALTH CARE FACILITY: Primary | ICD-10-CM

## 2022-09-28 DIAGNOSIS — M25.512 CHRONIC LEFT SHOULDER PAIN: ICD-10-CM

## 2022-09-28 DIAGNOSIS — Z12.31 VISIT FOR SCREENING MAMMOGRAM: ICD-10-CM

## 2022-09-28 DIAGNOSIS — Z23 NEED FOR COVID-19 VACCINE: ICD-10-CM

## 2022-09-28 PROBLEM — N18.2 CKD (CHRONIC KIDNEY DISEASE) STAGE 2, GFR 60-89 ML/MIN: Status: RESOLVED | Noted: 2020-07-13 | Resolved: 2022-09-28

## 2022-09-28 PROCEDURE — 77063 BREAST TOMOSYNTHESIS BI: CPT | Mod: TC | Performed by: RADIOLOGY

## 2022-09-28 PROCEDURE — G0145 SCR C/V CYTO,THINLAYER,RESCR: HCPCS | Performed by: NURSE PRACTITIONER

## 2022-09-28 PROCEDURE — 87624 HPV HI-RISK TYP POOLED RSLT: CPT | Performed by: NURSE PRACTITIONER

## 2022-09-28 PROCEDURE — 0124A COVID-19,PF,PFIZER BOOSTER BIVALENT: CPT | Performed by: NURSE PRACTITIONER

## 2022-09-28 PROCEDURE — 99396 PREV VISIT EST AGE 40-64: CPT | Performed by: NURSE PRACTITIONER

## 2022-09-28 PROCEDURE — 91312 COVID-19,PF,PFIZER BOOSTER BIVALENT: CPT | Performed by: NURSE PRACTITIONER

## 2022-09-28 PROCEDURE — 77067 SCR MAMMO BI INCL CAD: CPT | Mod: TC | Performed by: RADIOLOGY

## 2022-09-28 ASSESSMENT — PAIN SCALES - GENERAL: PAINLEVEL: MODERATE PAIN (4)

## 2022-09-28 ASSESSMENT — ENCOUNTER SYMPTOMS
ARTHRALGIAS: 1
BREAST MASS: 0

## 2022-09-28 NOTE — PROGRESS NOTES
SUBJECTIVE:   CC: Sury is an 61 year old who presents for preventive health visit.       Patient has been advised of split billing requirements and indicates understanding: Yes  Healthy Habits:     Getting at least 3 servings of Calcium per day:  Yes    Bi-annual eye exam:  NO    Dental care twice a year:  Yes    Sleep apnea or symptoms of sleep apnea:  None    Diet:  Regular (no restrictions)    Frequency of exercise:  4-5 days/week    Duration of exercise:  45-60 minutes    Taking medications regularly:  Yes    Medication side effects:  None    PHQ-2 Total Score: 0    Additional concerns today:  Yes    Pap - due 2/2023  Mammo - utd  Colonoscopy - utd    Limited mobility of left shoulder. Very active in pilates 4x/week. Sometimes radiates down into thumb. Worse overnight when sleeping.    Today's PHQ-2 Score:   PHQ-2 ( 1999 Pf[employer]) 9/27/2022   Q1: Little interest or pleasure in doing things 0   Q2: Feeling down, depressed or hopeless 0   PHQ-2 Score 0   PHQ-2 Total Score (12-17 Years)- Positive if 3 or more points; Administer PHQ-A if positive -   Q1: Little interest or pleasure in doing things Not at all   Q2: Feeling down, depressed or hopeless Not at all   PHQ-2 Score 0       Abuse: Current or Past (Physical, Sexual or Emotional) - No  Do you feel safe in your environment? Yes        Social History     Tobacco Use     Smoking status: Never Smoker     Smokeless tobacco: Never Used   Substance Use Topics     Alcohol use: Yes     Comment: 2-3 times a month, 2 at a time       Alcohol Use 9/27/2022   Prescreen: >3 drinks/day or >7 drinks/week? No   Prescreen: >3 drinks/day or >7 drinks/week? -       Reviewed orders with patient.  Reviewed health maintenance and updated orders accordingly - Yes  BP Readings from Last 3 Encounters:   09/28/22 108/78   08/02/21 98/64   07/10/20 118/79    Wt Readings from Last 3 Encounters:   09/28/22 74.4 kg (164 lb)   08/02/21 74.1 kg (163 lb 4.8 oz)   07/10/20 69.9 kg (154 lb)          Breast Cancer Screening:    FHS-7:   Breast CA Risk Assessment (FHS-7) 8/2/2021 8/2/2021 9/27/2022 9/28/2022   Did any of your first-degree relatives have breast or ovarian cancer? Yes Yes Yes (No Data)   Did any of your relatives have bilateral breast cancer? No Yes Yes No   Did any man in your family have breast cancer? No No No -   Did any woman in your family have breast and ovarian cancer? No Yes Yes Yes   Did any woman in your family have breast cancer before age 50 y? No Yes Yes Yes   Do you have 2 or more relatives with breast and/or ovarian cancer? No Yes Yes -   Do you have 2 or more relatives with breast and/or bowel cancer? No Yes Yes -     Mammogram Screening: Recommended mammography every 1-2 years with patient discussion and risk factor consideration  Pertinent mammograms are reviewed under the imaging tab.    History of abnormal Pap smear: NO - age 30-65 PAP every 5 years with negative HPV co-testing recommended  PAP / HPV Latest Ref Rng & Units 2/23/2018   PAP (Historical) - NIL   HPV16 NEG:Negative Negative   HPV18 NEG:Negative Negative   HRHPV NEG:Negative Negative     Reviewed and updated as needed this visit by clinical staff   Tobacco  Allergies               Reviewed and updated as needed this visit by Provider                   Patient Active Problem List   Diagnosis     Family history of malignant neoplasm of breast     Eczema, unspecified type     Past Medical History:   Diagnosis Date     Eczema, unspecified type 2/23/2018     Past Surgical History:   Procedure Laterality Date     GYN SURGERY      uterine polyp removal     Family History   Problem Relation Age of Onset     Lung Cancer Father         smoker     Breast Cancer Mother 58     Breast Cancer Sister 40     Social History     Socioeconomic History     Marital status:      Spouse name: Not on file     Number of children: Not on file     Years of education: Not on file     Highest education level: Not on file    Occupational History     Not on file   Tobacco Use     Smoking status: Never Smoker     Smokeless tobacco: Never Used   Vaping Use     Vaping Use: Never used   Substance and Sexual Activity     Alcohol use: Yes     Comment: 2-3 times a month, 2 at a time     Drug use: No     Sexual activity: Not Currently   Other Topics Concern     Parent/sibling w/ CABG, MI or angioplasty before 65F 55M? Not Asked   Social History Narrative      suddenly in his sleep 4 years ago. Has three daughters. Retired, was working in a dental office.        Yoselin Trejo, DNP, APRN, CNP    21     Social Determinants of Health     Financial Resource Strain: Low Risk      Difficulty of Paying Living Expenses: Not hard at all   Food Insecurity: No Food Insecurity     Worried About Running Out of Food in the Last Year: Never true     Ran Out of Food in the Last Year: Never true   Transportation Needs: No Transportation Needs     Lack of Transportation (Medical): No     Lack of Transportation (Non-Medical): No   Physical Activity: Sufficiently Active     Days of Exercise per Week: 5 days     Minutes of Exercise per Session: 60 min   Stress: No Stress Concern Present     Feeling of Stress : Only a little   Social Connections: Moderately Integrated     Frequency of Communication with Friends and Family: More than three times a week     Frequency of Social Gatherings with Friends and Family: More than three times a week     Attends [employer] Services: 1 to 4 times per year     Active Member of Clubs or Organizations: Yes     Attends Club or Organization Meetings: Not on file     Marital Status:    Intimate Partner Violence: Not on file   Housing Stability: Low Risk      Unable to Pay for Housing in the Last Year: No     Number of Places Lived in the Last Year: 1     Unstable Housing in the Last Year: No     No current outpatient medications on file.     No current facility-administered medications for this visit.       "  Allergies   Allergen Reactions     Penicillins          Review of Systems   Breasts:  Negative for tenderness, breast mass and discharge.   Genitourinary: Negative for pelvic pain, vaginal bleeding and vaginal discharge.   Musculoskeletal: Positive for arthralgias.        OBJECTIVE:   /78   Pulse 64   Temp 97.3  F (36.3  C) (Tympanic)   Resp 16   Ht 1.676 m (5' 6\")   Wt 74.4 kg (164 lb)   SpO2 98%   BMI 26.47 kg/m    Physical Exam  Constitutional: appears to be in no acute distress, comfortable, pleasant.   Eyes: anicteric, conjunctiva clear without drainage or erythema. HENRY.   Ears, Nose and Throat: tympanic membranes gray with LR,  nose without nasal discharge. OP: no erythema to posterior pharynx, negative post nasal drainage, tonsils +1 no erythema or exudate.  Neck: supple, thyroid palpable,not enlarged, no nodules   Breast: Exam deferred (deferred after discussion of exam options with patient, no symptoms or concerns).   Cardiovascular: regular rate and rhythm, normal S1 and S2, no murmurs, rubs or gallops, peripheral pulses full and symmetric; negative peripheral edema   Respiratory: Air entry throughout. Breathing pattern unlabored without the use of accessory muscles. Clear to auscultation A and P, no wheezes or crackles, normal breath sounds.    Gastrointestinal: rounded, soft. Positive bowel sounds x4, nontender, no masses.   Genitourinary: external genitalia is without lesions. Introitus is normal, vaginal walls pink and moist without lesions or evidence of trauma. There is no abnormal discharge from the cervix. Cervix is pink without polyps or lesions.  Musculoskeletal: full range of motion, no edema.   Skin: pink, turgor smooth and elastic. Negative for lesions or dryness.  Neurological: normal gait, no tremor.   Psychological: appropriate mood and affect.   Lymphatic: no cervical, axillary, supraclavicular, or infraclavicular lymphadenopathy.    Diagnostic Test Results:  Labs reviewed " "in Epic    ASSESSMENT/PLAN:   (Z00.00) Routine general medical examination at a health care facility  (primary encounter diagnosis)  Age appropriate screening and preventative care have been addressed today. Vaccinations have been updated. Patient has been advised to follow a balanced diet with adequate amounts of calcium and vitamin D. They have been advised to undertake routine aerobic activity. Colonoscopy has been reviewed and is up to date at this time. Recommend annual vision exams as well as biannual dental exams. They will follow up for annual physical again in one year.   - Lipid panel reflex to direct LDL Fasting,   - Comprehensive metabolic panel (BMP + Alb, Alk Phos, ALT, AST, Total. Bili, TP)  - Pap - due 2/2023  - Mammo - utd  - Colonoscopy - utd         (Z12.4) Encounter for screening for cervical cancer  - Pap screen with HPV - recommended age 30 - 65 years          (M25.512,  G89.29) Chronic left shoulder pain  Chronic left shoulder pain with limited ROM. Is able to continue her ADL's but interested in ortho referral.  - Orthopedic  Referral         (Z23) Need for COVID-19 vaccine  - COVID-19,PF,PFIZER BOOSTER BIVALENT           Follow-up:   - Return for fasting labs.      COUNSELING:  Reviewed preventive health counseling, as reflected in patient instructions  Special attention given to:        Regular exercise       Healthy diet/nutrition       Immunizations       Osteoporosis prevention/bone health       Colorectal Cancer Screening    Estimated body mass index is 26.47 kg/m  as calculated from the following:    Height as of this encounter: 1.676 m (5' 6\").    Weight as of this encounter: 74.4 kg (164 lb).    Weight management plan: Discussed healthy diet and exercise guidelines    She reports that she has never smoked. She has never used smokeless tobacco.      Counseling Resources:  ATP IV Guidelines  Pooled Cohorts Equation Calculator  Breast Cancer Risk Calculator  BRCA-Related Cancer " Risk Assessment: FHS-7 Tool  FRAX Risk Assessment  ICSI Preventive Guidelines  Dietary Guidelines for Americans, 2010  USDA's MyPlate  ASA Prophylaxis  Lung CA Screening    JENARO Kraft CNP  [facility]

## 2022-09-28 NOTE — PATIENT INSTRUCTIONS
Adequate calcium is necessary for good health, and not just because it's a major component of our bones. It also plays a vital role in keeping our organs and skeletal muscles working properly.    Calcium -- A rough method of estimating dietary calcium intake is to multiply the number of dairy servings consumed per day by 300 mg. One serving is 8 oz (240 mL) of milk or yogurt or 1 oz (29 g) of hard cheese. Cottage cheese and ice cream contain approximately 150 mg of calcium per 4 oz (113 g). Other foods in a well-balanced diet (dark green vegetables, some nuts, breads, and cereals) supply an average of 100 to 200 mg of calcium daily. Some cereals, soy products, and fruit juices are fortified with up to 1000 mg of calcium.    Recommended amount of daily calcium: 1,000-1,200 milligrams of calcium per day.   Recommend amount of daily Vitamin D3: 2000 units.     Formulations:  The most widely available calcium supplements are calcium carbonate and Calcium citrate. Calcium carbonate is cheapest and therefore often a good first choice. However, there are some limitations to its use compared with calcium citrate:  ?Calcium carbonate absorption is better when taken with meals; in comparison, calcium citrate as well absorbed in the fasting state and is equally absorbed compared with calcium carbonate taken with a meal. This may be particularly important in patients with achlorhydria. Thus, it seems prudent to take calcium carbonate with meals since it is often hard to know who has achlorhydria.  ?Calcium carbonate is also poorly absorbed in patients taking proton pump inhibitors (PPIs) or H2 blockers. We usually recommend calcium citrate as a first-line calcium supplement in these patients.    Preventive Health Recommendations  Female Ages 50 - 64    Yearly exam: See your health care provider every year in order to  Review health changes.   Discuss preventive care.    Review your medicines if your doctor has prescribed  any.    Get a Pap test every three years (unless you have an abnormal result and your provider advises testing more often).  If you get Pap tests with HPV test, you only need to test every 5 years, unless you have an abnormal result.   You do not need a Pap test if your uterus was removed (hysterectomy) and you have not had cancer.  You should be tested each year for STDs (sexually transmitted diseases) if you're at risk.   Have a mammogram every 1 to 2 years.  Have a colonoscopy at age 50, or have a yearly FIT test (stool test). These exams screen for colon cancer.    Have a cholesterol test every 5 years, or more often if advised.  Have a diabetes test (fasting glucose) every three years. If you are at risk for diabetes, you should have this test more often.   If you are at risk for osteoporosis (brittle bone disease), think about having a bone density scan (DEXA).    Shots: Get a flu shot each year. Get a tetanus shot every 10 years.    Nutrition:   Eat at least 5 servings of fruits and vegetables each day.  Eat whole-grain bread, whole-wheat pasta and brown rice instead of white grains and rice.  Get adequate Calcium and Vitamin D.     Lifestyle  Exercise at least 150 minutes a week (30 minutes a day, 5 days a week). This will help you control your weight and prevent disease.  Limit alcohol to one drink per day.  No smoking.   Wear sunscreen to prevent skin cancer.   See your dentist every six months for an exam and cleaning.  See your eye doctor every 1 to 2 years.

## 2022-10-01 LAB
BKR LAB AP GYN ADEQUACY: NORMAL
BKR LAB AP GYN INTERPRETATION: NORMAL
BKR LAB AP HPV REFLEX: NORMAL
BKR LAB AP PREVIOUS ABNORMAL: NORMAL
PATH REPORT.COMMENTS IMP SPEC: NORMAL
PATH REPORT.COMMENTS IMP SPEC: NORMAL
PATH REPORT.RELEVANT HX SPEC: NORMAL

## 2022-10-04 LAB
HUMAN PAPILLOMA VIRUS 16 DNA: NEGATIVE
HUMAN PAPILLOMA VIRUS 18 DNA: NEGATIVE
HUMAN PAPILLOMA VIRUS FINAL DIAGNOSIS: NORMAL
HUMAN PAPILLOMA VIRUS OTHER HR: NEGATIVE

## 2022-10-11 ENCOUNTER — LAB (OUTPATIENT)
Dept: LAB | Facility: CLINIC | Age: 62
End: 2022-10-11
Payer: COMMERCIAL

## 2022-10-11 DIAGNOSIS — Z00.00 ROUTINE GENERAL MEDICAL EXAMINATION AT A HEALTH CARE FACILITY: ICD-10-CM

## 2022-10-11 LAB
ALBUMIN SERPL-MCNC: 3.8 G/DL (ref 3.4–5)
ALP SERPL-CCNC: 79 U/L (ref 40–150)
ALT SERPL W P-5'-P-CCNC: 33 U/L (ref 0–50)
ANION GAP SERPL CALCULATED.3IONS-SCNC: 6 MMOL/L (ref 3–14)
AST SERPL W P-5'-P-CCNC: 23 U/L (ref 0–45)
BILIRUB SERPL-MCNC: 0.5 MG/DL (ref 0.2–1.3)
BUN SERPL-MCNC: 18 MG/DL (ref 7–30)
CALCIUM SERPL-MCNC: 9.2 MG/DL (ref 8.5–10.1)
CHLORIDE BLD-SCNC: 106 MMOL/L (ref 94–109)
CHOLEST SERPL-MCNC: 167 MG/DL
CO2 SERPL-SCNC: 27 MMOL/L (ref 20–32)
CREAT SERPL-MCNC: 0.92 MG/DL (ref 0.52–1.04)
FASTING STATUS PATIENT QL REPORTED: YES
GFR SERPL CREATININE-BSD FRML MDRD: 70 ML/MIN/1.73M2
GLUCOSE BLD-MCNC: 98 MG/DL (ref 70–99)
HDLC SERPL-MCNC: 48 MG/DL
LDLC SERPL CALC-MCNC: 97 MG/DL
NONHDLC SERPL-MCNC: 119 MG/DL
POTASSIUM BLD-SCNC: 4.4 MMOL/L (ref 3.4–5.3)
PROT SERPL-MCNC: 7 G/DL (ref 6.8–8.8)
SODIUM SERPL-SCNC: 139 MMOL/L (ref 133–144)
TRIGL SERPL-MCNC: 108 MG/DL

## 2022-10-11 PROCEDURE — 80053 COMPREHEN METABOLIC PANEL: CPT

## 2022-10-11 PROCEDURE — 80061 LIPID PANEL: CPT

## 2022-10-11 PROCEDURE — 36415 COLL VENOUS BLD VENIPUNCTURE: CPT

## 2022-10-21 ENCOUNTER — ANCILLARY PROCEDURE (OUTPATIENT)
Dept: GENERAL RADIOLOGY | Facility: CLINIC | Age: 62
End: 2022-10-21
Attending: STUDENT IN AN ORGANIZED HEALTH CARE EDUCATION/TRAINING PROGRAM
Payer: COMMERCIAL

## 2022-10-21 ENCOUNTER — OFFICE VISIT (OUTPATIENT)
Dept: ORTHOPEDICS | Facility: CLINIC | Age: 62
End: 2022-10-21
Attending: NURSE PRACTITIONER
Payer: COMMERCIAL

## 2022-10-21 VITALS
HEIGHT: 66 IN | BODY MASS INDEX: 26.36 KG/M2 | WEIGHT: 164 LBS | SYSTOLIC BLOOD PRESSURE: 128 MMHG | DIASTOLIC BLOOD PRESSURE: 80 MMHG

## 2022-10-21 DIAGNOSIS — M25.512 CHRONIC LEFT SHOULDER PAIN: ICD-10-CM

## 2022-10-21 DIAGNOSIS — M75.40 ROTATOR CUFF IMPINGEMENT SYNDROME, UNSPECIFIED LATERALITY: ICD-10-CM

## 2022-10-21 DIAGNOSIS — M25.512 CHRONIC PAIN OF BOTH SHOULDERS: Primary | ICD-10-CM

## 2022-10-21 DIAGNOSIS — G89.29 CHRONIC LEFT SHOULDER PAIN: ICD-10-CM

## 2022-10-21 DIAGNOSIS — M25.511 CHRONIC PAIN OF BOTH SHOULDERS: Primary | ICD-10-CM

## 2022-10-21 DIAGNOSIS — M67.819 TENDINOSIS OF ROTATOR CUFF: ICD-10-CM

## 2022-10-21 DIAGNOSIS — G89.29 CHRONIC PAIN OF BOTH SHOULDERS: Primary | ICD-10-CM

## 2022-10-21 DIAGNOSIS — M25.511 CHRONIC RIGHT SHOULDER PAIN: ICD-10-CM

## 2022-10-21 DIAGNOSIS — G89.29 CHRONIC RIGHT SHOULDER PAIN: ICD-10-CM

## 2022-10-21 PROCEDURE — 99204 OFFICE O/P NEW MOD 45 MIN: CPT | Performed by: STUDENT IN AN ORGANIZED HEALTH CARE EDUCATION/TRAINING PROGRAM

## 2022-10-21 PROCEDURE — 73030 X-RAY EXAM OF SHOULDER: CPT | Mod: TC | Performed by: RADIOLOGY

## 2022-10-21 NOTE — LETTER
10/21/2022         RE: Sury Castellanos  [address] [place] [address]        Dear Colleague,    Thank you for referring your patient, Sury Castellanos, to the [facility] [place]. Please see a copy of my visit note below.    ASSESSMENT & PLAN  Patient Instructions     1. Chronic pain of both shoulders    2. Rotator cuff impingement syndrome, unspecified laterality    3. Tendinosis of rotator cuff      Sury Castellanos is a 61 year old female presenting for evaluation of chronic bilateral (L>R) shoulder stiffness and pain. History, examination and imaging are consistent with rotator cuff tendinosis and impingement. Differential includes rotator cuff tear although intact strength on exam is reassuring. Differential also includes cervical stenosis or radiculopathy (pinched nerve in the neck) contributing to radiating symptoms, although exam today is most suggestive for true shoulder dysfunction. No history of neck pain or red flags to warrant urgent advanced imaging or intervention. Reviewed treatment plan inclusive of pain management (topicals, NSAIDS, steroid injection), activity modification (avoiding painful activities), formal physical therapy and timing of advance imaging (ie MRI).      At this time, plan to proceed with the following:  - Recommend gentle range of motion (wall crawls and pendulum swings) and the sleeper stretch.  - Activity as tolerated based on pain.  - Referral placed for formal physical therapy - exercises to include pain free range of motion of the shoulder, scapular stabilization, stretching of the shoulder capsule, strengthening of the periscapular, rotator cuff, and deltoid muscles, with progression to functional rehabilitation with home exercise prescription.  - Ibuprofen as needed for pain. Alternatively, topical diclofenac (Voltaren) gel may be applied to the painful area 3-4 times per day for up to 2 weeks, then reduce to as-needed.  This is an over-the-counter topical non-steroidal anti-inflammatory (NSAID) medication. Do not use with other NSAIDS like ibuprofen or advil.    If unable to progress with PT due to pain, then return for consideration of steroid injection. If shoulder pain is not improved / improving throughout course of PT then return for re-evaluation / consideration for advanced imaging (MRI).    Please schedule a follow up appointment to see me in 6 weeks, or sooner as needed for persistence or worsening of pain. You may call our direct clinic number ([phone]) at any time with questions or concerns.    Edith Guerra MD, [facility]          -----    SUBJECTIVE  Sury Castellanos is a/an 61 year old Right handed female who is seen in consultation at the request of  Yoselin Trejo C.N.P. for evaluation of bilateral shoulder pain (L>R). The patient is seen by themselves.    Onset: 3 years(s) ago. Reports insidious onset without acute precipitating event. She was referred to physical therapy for treatment of suspected left shoulder adhesive capsulitis, with resulting improvement in symptoms after 5 sessions.   Location of Pain: bilateral proximal upper arms  Rating of Pain at worst: 7/10  Rating of Pain Currently: 5/10  Worsened by: sleeping on sides, reaching overhead - above shoulder height, pushing, pain wakes her up at night, cold weather, golfing - has changed swing because she is unable to follow through  Better with: rest / activity avoidance, gentle movements  Treatments tried: rest/activity avoidance, heat, ibuprofen, home exercises, physical therapy (5 visits in 2020) and TENS unit  Associated symptoms: pain intermittently radiates down the arms; no neck pain, numbness, tingling or weakness; no change in sx with bearing down, cough or sneeze; no change in bowel or bladder; denies swelling or warmth  Orthopedic history: NO  Relevant surgical history: NO  Social  history: works - office 1 day/week, pilates 4x/week, enjoys golfing    Past Medical History:   Diagnosis Date     Eczema, unspecified type 2018     Social History     Socioeconomic History     Marital status:    Tobacco Use     Smoking status: Never     Smokeless tobacco: Never   Vaping Use     Vaping Use: Never used   Substance and Sexual Activity     Alcohol use: Yes     Comment: 2-3 times a month, 2 at a time     Drug use: No     Sexual activity: Not Currently   Social History Narrative      suddenly in his sleep 4 years ago. Has three daughters. 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    First grandchild due in 3 weeks, girl.     Enjoys pickle ball, golf, pilates.        Yoselin Trejo, DNP, APRN, CNP    22         Social Determinants of Health     Financial Resource Strain: Low Risk      Difficulty of Paying Living Expenses: Not hard at all   Food Insecurity: No Food Insecurity     Worried About Running Out of Food in the Last Year: Never true     Ran Out of Food in the Last Year: Never true   Transportation Needs: No Transportation Needs     Lack of Transportation (Medical): No     Lack of Transportation (Non-Medical): No   Physical Activity: Sufficiently Active     Days of Exercise per Week: 5 days     Minutes of Exercise per Session: 60 min   Stress: No Stress Concern Present     Feeling of Stress : Only a little   Social Connections: Moderately Integrated     Frequency of Communication with Friends and Family: More than three times a week     Frequency of Social Gatherings with Friends and Family: More than three times a week     Attends [employer] Services: 1 to 4 times per year     Active Member of Clubs or Organizations: Yes     Marital Status:    Housing Stability: Low Risk      Unable to Pay for Housing in the Last Year: No     Number of Places Lived in the Last Year: 1     Unstable Housing in the Last Year:  "No         Patient's past medical, surgical, social, and family histories were reviewed today and no changes are noted.    REVIEW OF SYSTEMS:  10 point ROS is negative other than symptoms noted above in HPI, Past Medical History or as stated below  Constitutional: NEGATIVE for fever, chills, change in weight  Skin: NEGATIVE for worrisome rashes, moles or lesions  GI/: NEGATIVE for bowel or bladder changes  Neuro: NEGATIVE for weakness, dizziness or paresthesias    OBJECTIVE:  /80   Ht 1.676 m (5' 6\")   Wt 74.4 kg (164 lb)   BMI 26.47 kg/m     General: healthy, alert and in no distress  HEENT: no scleral icterus or conjunctival erythema  Skin: no suspicious lesions or rash. No jaundice.  CV: regular rhythm by palpation  Resp: normal respiratory effort without conversational dyspnea   Psych: normal mood and affect  Gait: normal steady gait with appropriate coordination and balance  Neuro: normal light touch sensory exam of the bilateral upper extremities.    MSK:  BILATERAL SHOULDER  Inspection:    no swelling, bruising, discoloration, or obvious deformity or asymmetry  Palpation:    Tender about the deltoid. Remainder of bony and tendinous landmarks are nontender.  Active/Passive Range of Motion:     Left: Abduction 165/1750, /1750, , IR L4.     Right: Abduction 1800, FF 1800, , IR T10.      Scapular dyskinesis absent  Strength:    Scapular plane abduction 5/5 (painful left),  ER 5/5, IR 5/5, biceps 5/5, triceps 5/5  Special Tests:    Positive: Neer's, Snyder', supraspinatus (empty can) and drop arm/painful arc    Negative: Speed's    Independent visualization of the below image:  Recent Results (from the past 24 hour(s))   XR Shoulder Bilateral G/E 2 Views    Narrative    SHOULDER BILATERAL TWO OR MORE VIEWS 10/21/2022 11:03 AM     INDICATION: Bilateral shoulder pain.     COMPARISON: None.      Impression    IMPRESSION:  1.  Right shoulder: Mild glenohumeral and " acromioclavicular  degenerative arthrosis. No fracture.  2.  Left shoulder: Normal joint spacing and alignment. No fracture.         Edith Guerra MD, [facility]        Again, thank you for allowing me to participate in the care of your patient.        Sincerely,        Edith Guerra MD

## 2022-10-21 NOTE — PATIENT INSTRUCTIONS
1. Chronic pain of both shoulders    2. Rotator cuff impingement syndrome, unspecified laterality    3. Tendinosis of rotator cuff      Sury Vanna Castellanos is a 61 year old female presenting for evaluation of chronic bilateral (L>R) shoulder stiffness and pain. History, examination and imaging are consistent with rotator cuff tendinosis and impingement. Differential includes rotator cuff tear although intact strength on exam is reassuring. Reviewed treatment plan inclusive of pain management (topicals, NSAIDS, steroid injection), activity modification (avoiding painful activities), formal physical therapy and timing of advance imaging (ie MRI).      At this time, plan to proceed with the following:  - Recommend gentle range of motion (wall crawls and pendulum swings) and the sleeper stretch.  - Activity as tolerated based on pain.  - Referral placed for formal physical therapy - exercises to include pain free range of motion of the shoulder, scapular stabilization, stretching of the shoulder capsule, strengthening of the periscapular, rotator cuff, and deltoid muscles, with progression to functional rehabilitation with home exercise prescription.  - Ibuprofen as needed for pain. Alternatively, topical diclofenac (Voltaren) gel may be applied to the painful area 3-4 times per day for up to 2 weeks, then reduce to as-needed. This is an over-the-counter topical non-steroidal anti-inflammatory (NSAID) medication. Do not use with other NSAIDS like ibuprofen or advil.    If unable to progress with PT due to pain, then return for consideration of steroid injection. If shoulder pain is not improved / improving throughout course of PT then return for re-evaluation / consideration for advanced imaging (MRI).    Please schedule a follow up appointment to see me in 6 weeks, or sooner as needed for persistence or worsening of pain. You may call our direct clinic number ([phone]) at any time with questions or  concerns.    Edith Guerra MD, CAQSM  [facility]

## 2022-10-21 NOTE — PROGRESS NOTES
ASSESSMENT & PLAN  Patient Instructions     1. Chronic pain of both shoulders    2. Rotator cuff impingement syndrome, unspecified laterality    3. Tendinosis of rotator cuff      Sury Vanna Castellanos is a 61 year old female presenting for evaluation of chronic bilateral (L>R) shoulder stiffness and pain. History, examination and imaging are consistent with rotator cuff tendinosis and impingement. Differential includes rotator cuff tear although intact strength on exam is reassuring. Differential also includes cervical stenosis or radiculopathy (pinched nerve in the neck) contributing to radiating symptoms, although exam today is most suggestive for true shoulder dysfunction. No history of neck pain or red flags to warrant urgent advanced imaging or intervention. Reviewed treatment plan inclusive of pain management (topicals, NSAIDS, steroid injection), activity modification (avoiding painful activities), formal physical therapy and timing of advance imaging (ie MRI).      At this time, plan to proceed with the following:  - Recommend gentle range of motion (wall crawls and pendulum swings) and the sleeper stretch.  - Activity as tolerated based on pain.  - Referral placed for formal physical therapy - exercises to include pain free range of motion of the shoulder, scapular stabilization, stretching of the shoulder capsule, strengthening of the periscapular, rotator cuff, and deltoid muscles, with progression to functional rehabilitation with home exercise prescription.  - Ibuprofen as needed for pain. Alternatively, topical diclofenac (Voltaren) gel may be applied to the painful area 3-4 times per day for up to 2 weeks, then reduce to as-needed. This is an over-the-counter topical non-steroidal anti-inflammatory (NSAID) medication. Do not use with other NSAIDS like ibuprofen or advil.    If unable to progress with PT due to pain, then return for consideration of steroid injection. If shoulder pain is not improved /  improving throughout course of PT then return for re-evaluation / consideration for advanced imaging (MRI).    Please schedule a follow up appointment to see me in 6 weeks, or sooner as needed for persistence or worsening of pain. You may call our direct clinic number ([phone]) at any time with questions or concerns.    Edith Guerra MD, [facility]          -----    SUBJECTIVE  Sury Castellanos is a/an 61 year old Right handed female who is seen in consultation at the request of  Yoselin Trejo C.N.P. for evaluation of bilateral shoulder pain (L>R). The patient is seen by themselves.    Onset: 3 years(s) ago. Reports insidious onset without acute precipitating event. She was referred to physical therapy for treatment of suspected left shoulder adhesive capsulitis, with resulting improvement in symptoms after 5 sessions.   Location of Pain: bilateral proximal upper arms  Rating of Pain at worst: 7/10  Rating of Pain Currently: 5/10  Worsened by: sleeping on sides, reaching overhead - above shoulder height, pushing, pain wakes her up at night, cold weather, golfing - has changed swing because she is unable to follow through  Better with: rest / activity avoidance, gentle movements  Treatments tried: rest/activity avoidance, heat, ibuprofen, home exercises, physical therapy (5 visits in 2020) and TENS unit  Associated symptoms: pain intermittently radiates down the arms; no neck pain, numbness, tingling or weakness; no change in sx with bearing down, cough or sneeze; no change in bowel or bladder; denies swelling or warmth  Orthopedic history: NO  Relevant surgical history: NO  Social history: works - office 1 day/week, pilates 4x/week, enjoys golfing    Past Medical History:   Diagnosis Date     Eczema, unspecified type 2/23/2018     Social History     Socioeconomic History     Marital status:    Tobacco Use     Smoking status: Never     Smokeless  tobacco: Never   Vaping Use     Vaping Use: Never used   Substance and Sexual Activity     Alcohol use: Yes     Comment: 2-3 times a month, 2 at a time     Drug use: No     Sexual activity: Not Currently   Social History Narrative      suddenly in his sleep 4 years ago. Has three daughters. 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    First grandchild due in 3 weeks, girl.     Enjoys pickle ball, golf, pilates.        Yoselin Trejo, DNP, APRN, CNP    22         Social Determinants of Health     Financial Resource Strain: Low Risk      Difficulty of Paying Living Expenses: Not hard at all   Food Insecurity: No Food Insecurity     Worried About Running Out of Food in the Last Year: Never true     Ran Out of Food in the Last Year: Never true   Transportation Needs: No Transportation Needs     Lack of Transportation (Medical): No     Lack of Transportation (Non-Medical): No   Physical Activity: Sufficiently Active     Days of Exercise per Week: 5 days     Minutes of Exercise per Session: 60 min   Stress: No Stress Concern Present     Feeling of Stress : Only a little   Social Connections: Moderately Integrated     Frequency of Communication with Friends and Family: More than three times a week     Frequency of Social Gatherings with Friends and Family: More than three times a week     Attends Bahai Services: 1 to 4 times per year     Active Member of Clubs or Organizations: Yes     Marital Status:    Housing Stability: Low Risk      Unable to Pay for Housing in the Last Year: No     Number of Places Lived in the Last Year: 1     Unstable Housing in the Last Year: No         Patient's past medical, surgical, social, and family histories were reviewed today and no changes are noted.    REVIEW OF SYSTEMS:  10 point ROS is negative other than symptoms noted above in HPI, Past Medical History or as stated below  Constitutional: NEGATIVE  "for fever, chills, change in weight  Skin: NEGATIVE for worrisome rashes, moles or lesions  GI/: NEGATIVE for bowel or bladder changes  Neuro: NEGATIVE for weakness, dizziness or paresthesias    OBJECTIVE:  /80   Ht 1.676 m (5' 6\")   Wt 74.4 kg (164 lb)   BMI 26.47 kg/m     General: healthy, alert and in no distress  HEENT: no scleral icterus or conjunctival erythema  Skin: no suspicious lesions or rash. No jaundice.  CV: regular rhythm by palpation  Resp: normal respiratory effort without conversational dyspnea   Psych: normal mood and affect  Gait: normal steady gait with appropriate coordination and balance  Neuro: normal light touch sensory exam of the bilateral upper extremities.    MSK:  BILATERAL SHOULDER  Inspection:    no swelling, bruising, discoloration, or obvious deformity or asymmetry  Palpation:    Tender about the deltoid. Remainder of bony and tendinous landmarks are nontender.  Active/Passive Range of Motion:     Left: Abduction 165/1750, /1750, , IR L4.     Right: Abduction 1800, FF 1800, , IR T10.      Scapular dyskinesis absent  Strength:    Scapular plane abduction 5/5 (painful left),  ER 5/5, IR 5/5, biceps 5/5, triceps 5/5  Special Tests:    Positive: Neer's, Snyder', supraspinatus (empty can) and drop arm/painful arc    Negative: Speed's    Independent visualization of the below image:  Recent Results (from the past 24 hour(s))   XR Shoulder Bilateral G/E 2 Views    Narrative    SHOULDER BILATERAL TWO OR MORE VIEWS 10/21/2022 11:03 AM     INDICATION: Bilateral shoulder pain.     COMPARISON: None.      Impression    IMPRESSION:  1.  Right shoulder: Mild glenohumeral and acromioclavicular  degenerative arthrosis. No fracture.  2.  Left shoulder: Normal joint spacing and alignment. No fracture.         Edith Guerra MD, [facility]    "

## 2022-10-22 ENCOUNTER — HEALTH MAINTENANCE LETTER (OUTPATIENT)
Age: 62
End: 2022-10-22

## 2022-11-01 ENCOUNTER — THERAPY VISIT (OUTPATIENT)
Dept: PHYSICAL THERAPY | Facility: CLINIC | Age: 62
End: 2022-11-01
Attending: STUDENT IN AN ORGANIZED HEALTH CARE EDUCATION/TRAINING PROGRAM
Payer: COMMERCIAL

## 2022-11-01 DIAGNOSIS — M25.512 CHRONIC PAIN OF BOTH SHOULDERS: ICD-10-CM

## 2022-11-01 DIAGNOSIS — M25.511 CHRONIC PAIN OF BOTH SHOULDERS: ICD-10-CM

## 2022-11-01 DIAGNOSIS — G89.29 CHRONIC PAIN OF BOTH SHOULDERS: ICD-10-CM

## 2022-11-01 DIAGNOSIS — M67.819 TENDINOSIS OF ROTATOR CUFF: ICD-10-CM

## 2022-11-01 DIAGNOSIS — M75.40 ROTATOR CUFF IMPINGEMENT SYNDROME, UNSPECIFIED LATERALITY: ICD-10-CM

## 2022-11-01 PROCEDURE — 97110 THERAPEUTIC EXERCISES: CPT | Mod: GP | Performed by: PHYSICAL THERAPIST

## 2022-11-01 PROCEDURE — 97161 PT EVAL LOW COMPLEX 20 MIN: CPT | Mod: GP | Performed by: PHYSICAL THERAPIST

## 2022-11-01 PROCEDURE — 97112 NEUROMUSCULAR REEDUCATION: CPT | Mod: GP | Performed by: PHYSICAL THERAPIST

## 2022-11-01 NOTE — PROGRESS NOTES
Physical Therapy Initial Evaluation  Subjective:    Patient Health History  Sury Castellanos being seen for Both shoulders have limited mobility- left worse than the right shoulder.Currently doing Pilates 4-5 times a week to maintain mobility..       Problem occurred: Not sure   Pain is reported as 5/10 on pain scale.  General health as reported by patient is good.  Pertinent medical history includes: pain at night/rest.     Medical allergies: other. Other medical allergies details: Penicillin.   Surgeries include:  Other. Other surgery history details:  - bunion surgery left foot .    Current medications:  Other. Other medications details: Sodium sulfacemide.    Current occupation is Retired - I work one day a week.   Primary job tasks include:  Prolonged sitting, prolonged standing, pushing/pulling and other.   Other job/home tasks details: Computer work on the day I work.              Physical Therapy Initial Evaluation   2022   Precautions/Restrictions/MD instructions: PT eval and treat    Therapist Impression:   Pt is a 60 y/o female, with chronic history of bilateral shoulder pain (L>R). Pt presents with pain, decreased ROM, poor posture and decreased strength consistent with rotator cuff tendoniosis. These impairments limit their ability to reach and lift overhead and sleep on your side. Skilled PT services necessary in order to reduce impairments and improve independent function.    Subjective:   Chief Complaint: Bilateral shoulder pain  DOI/onset: 10/15/2022 DOS: none   Location: B shoulder  Quality: tightness   Frequency: constant  Radiates: swelling noted around the thumbs  Pain scale: Rest 1/10 Activity 10/10    Sleeping: wakes due to shoulder pain (2X/night)    Exacerbated by: side sleeping, reaching/lifting overhead  Relieved by: none  Progression: same   Previous Treatment: PT Effect of prior treatment: minimal help   PMH and/or surgical history: none    Imaging: Xray     Occupation: retired (office 1X/week)  Job duties: computer    Current HEP/exercise regimen: pilates, pickle ball, golf, walk, bike  Patient's goals: painfree at night   Medications: refer to chart   General health as reported by patient: good   Return to MD: as needed   Red Flags: none                        Objective:  SHOULDER:    Standing Posture: slight forward head, rounded shoulders      Shoulder: (* indicates patient's pain)   PROM L PROM R AROM L AROM R MMT L MMT R   Flex 160 160 120* 150 4-/5 4-/5   Abd   93* 130     IR 60 60   4/5 3+/5   ER 90 90 50 56 4-/5 3+/5   Ext/IR   T11 T12         Palpation: No tender to palpation over biceps, supra, infra. B UT tone and tenderness      System    Physical Exam    General     ROS    Assessment/Plan:    Patient is a 61 year old female with both sides shoulder complaints.    Patient has the following significant findings with corresponding treatment plan.                Diagnosis 1:  Bilateral shoulder pain  Pain -  hot/cold therapy, self management, education and home program  Decreased ROM/flexibility - manual therapy and therapeutic exercise  Decreased joint mobility - manual therapy and therapeutic exercise  Decreased strength - therapeutic exercise and therapeutic activities  Inflammation - cold therapy and self management/home program  Impaired muscle performance - neuro re-education  Decreased function - therapeutic activities  Impaired posture - neuro re-education  Instability -  Therapeutic Activity  Therapeutic Exercise    Therapy Evaluation Codes:     Cumulative Therapy Evaluation is: Low complexity.    Previous and current functional limitations:  (See Goal Flow Sheet for this information)    Short term and Long term goals: (See Goal Flow Sheet for this information)     Communication ability:  Patient appears to be able to clearly communicate and understand verbal and written communication and follow directions correctly.  Treatment Explanation - The  following has been discussed with the patient:   RX ordered/plan of care  Anticipated outcomes  Possible risks and side effects  This patient would benefit from PT intervention to resume normal activities.   Rehab potential is good.    Frequency:  1 X week, once daily  Duration:  for 6 weeks  Discharge Plan:  Achieve all LTG.  Independent in home treatment program.  Reach maximal therapeutic benefit.    Please refer to the daily flowsheet for treatment today, total treatment time and time spent performing 1:1 timed codes.

## 2022-11-15 ENCOUNTER — THERAPY VISIT (OUTPATIENT)
Dept: PHYSICAL THERAPY | Facility: CLINIC | Age: 62
End: 2022-11-15
Payer: COMMERCIAL

## 2022-11-15 DIAGNOSIS — M67.819 TENDINOSIS OF ROTATOR CUFF: ICD-10-CM

## 2022-11-15 DIAGNOSIS — M25.512 CHRONIC PAIN OF BOTH SHOULDERS: Primary | ICD-10-CM

## 2022-11-15 DIAGNOSIS — G89.29 CHRONIC PAIN OF BOTH SHOULDERS: Primary | ICD-10-CM

## 2022-11-15 DIAGNOSIS — M25.511 CHRONIC PAIN OF BOTH SHOULDERS: Primary | ICD-10-CM

## 2022-11-15 PROCEDURE — 97112 NEUROMUSCULAR REEDUCATION: CPT | Mod: GP | Performed by: PHYSICAL THERAPIST

## 2022-11-15 PROCEDURE — 97110 THERAPEUTIC EXERCISES: CPT | Mod: GP | Performed by: PHYSICAL THERAPIST

## 2022-12-06 ENCOUNTER — THERAPY VISIT (OUTPATIENT)
Dept: PHYSICAL THERAPY | Facility: CLINIC | Age: 62
End: 2022-12-06
Payer: COMMERCIAL

## 2022-12-06 DIAGNOSIS — M67.819 TENDINOSIS OF ROTATOR CUFF: Primary | ICD-10-CM

## 2022-12-06 PROCEDURE — 97112 NEUROMUSCULAR REEDUCATION: CPT | Mod: GP | Performed by: PHYSICAL THERAPIST

## 2022-12-06 PROCEDURE — 97110 THERAPEUTIC EXERCISES: CPT | Mod: GP | Performed by: PHYSICAL THERAPIST

## 2023-01-06 ENCOUNTER — THERAPY VISIT (OUTPATIENT)
Dept: PHYSICAL THERAPY | Facility: CLINIC | Age: 63
End: 2023-01-06
Payer: COMMERCIAL

## 2023-01-06 DIAGNOSIS — M25.512 CHRONIC PAIN OF BOTH SHOULDERS: ICD-10-CM

## 2023-01-06 DIAGNOSIS — M25.511 CHRONIC PAIN OF BOTH SHOULDERS: ICD-10-CM

## 2023-01-06 DIAGNOSIS — M67.819 TENDINOSIS OF ROTATOR CUFF: Primary | ICD-10-CM

## 2023-01-06 DIAGNOSIS — G89.29 CHRONIC PAIN OF BOTH SHOULDERS: ICD-10-CM

## 2023-01-06 PROCEDURE — 97110 THERAPEUTIC EXERCISES: CPT | Mod: GP | Performed by: PHYSICAL THERAPIST

## 2023-01-06 PROCEDURE — 97112 NEUROMUSCULAR REEDUCATION: CPT | Mod: GP | Performed by: PHYSICAL THERAPIST

## 2023-02-21 ENCOUNTER — THERAPY VISIT (OUTPATIENT)
Dept: PHYSICAL THERAPY | Facility: CLINIC | Age: 63
End: 2023-02-21
Payer: COMMERCIAL

## 2023-02-21 DIAGNOSIS — M25.511 CHRONIC PAIN OF BOTH SHOULDERS: Primary | ICD-10-CM

## 2023-02-21 DIAGNOSIS — M25.512 CHRONIC PAIN OF BOTH SHOULDERS: Primary | ICD-10-CM

## 2023-02-21 DIAGNOSIS — G89.29 CHRONIC PAIN OF BOTH SHOULDERS: Primary | ICD-10-CM

## 2023-02-21 DIAGNOSIS — M75.40 ROTATOR CUFF IMPINGEMENT SYNDROME, UNSPECIFIED LATERALITY: ICD-10-CM

## 2023-02-21 PROCEDURE — 97110 THERAPEUTIC EXERCISES: CPT | Mod: GP | Performed by: PHYSICAL THERAPIST

## 2023-03-13 ENCOUNTER — THERAPY VISIT (OUTPATIENT)
Dept: PHYSICAL THERAPY | Facility: CLINIC | Age: 63
End: 2023-03-13
Payer: COMMERCIAL

## 2023-03-13 DIAGNOSIS — M25.512 CHRONIC PAIN OF BOTH SHOULDERS: Primary | ICD-10-CM

## 2023-03-13 DIAGNOSIS — M25.511 CHRONIC PAIN OF BOTH SHOULDERS: Primary | ICD-10-CM

## 2023-03-13 DIAGNOSIS — G89.29 CHRONIC PAIN OF BOTH SHOULDERS: Primary | ICD-10-CM

## 2023-03-13 PROCEDURE — 97110 THERAPEUTIC EXERCISES: CPT | Mod: GP | Performed by: PHYSICAL THERAPIST

## 2023-03-13 NOTE — PROGRESS NOTES
Subjective:  HPI  Physical Exam                    Objective:  System    Physical Exam    General     ROS    Assessment/Plan:    PROGRESS  REPORT    Progress reporting period is from 3/13/2023.       SUBJECTIVE  Subjective: Pt notes overall slight improvment in ROM. Cross body stretch 2-3X/day. Overhead reaching/lifting very challenging still.     Current Pain level: 0/10.      Initial Pain level: 8/10.   Changes in function:  Yes, better sleeping  Adverse reaction to treatment or activity: None    OBJECTIVE  Changes noted in objective findings:  Yes,   Objective: AROM: R/L hpusgpn=146/123; ER=70/66; Ext/IR/add=T10/T10 . PROM flexion: R=160; L=150; ER 90-90: R=90; L=85*. IR: R=50; L=50. Improved PROM ER after cross body S/L repeated motion. Recommended continue rotator cuff strengthening (weakness still noted in supraspinatus), significant improvement in infra/teres minor.     ASSESSMENT/PLAN  Updated problem list and treatment plan: Diagnosis 1:  Bilateral shoulder pain; rotator cuff dysfunction  Pain -  hot/cold therapy, self management, education and home program  Decreased ROM/flexibility - manual therapy and therapeutic exercise  Decreased joint mobility - manual therapy and therapeutic exercise  Decreased strength - therapeutic exercise and therapeutic activities  Inflammation - cold therapy and self management/home program  Impaired muscle performance - neuro re-education  Decreased function - therapeutic activities  Impaired posture - neuro re-education  STG/LTGs have been met or progress has been made towards goals:  Yes (See Goal flow sheet completed today.)  Assessment of Progress: The patient's condition has potential to improve.  Self Management Plans:  Patient has been instructed in a home treatment program.  I have re-evaluated this patient and find that the nature, scope, duration and intensity of the therapy is appropriate for the medical condition of the patient.  Sury continues to require the  following intervention to meet STG and LTG's:  PT    Recommendations:  This patient would benefit from continued therapy.     Frequency:  1-2 X a month, once daily  Duration:  for 3   months        Please refer to the daily flowsheet for treatment today, total treatment time and time spent performing 1:1 timed codes.

## 2023-04-27 ENCOUNTER — OFFICE VISIT (OUTPATIENT)
Dept: ORTHOPEDICS | Facility: CLINIC | Age: 63
End: 2023-04-27
Payer: COMMERCIAL

## 2023-04-27 VITALS — WEIGHT: 165 LBS | HEIGHT: 66 IN | BODY MASS INDEX: 26.52 KG/M2

## 2023-04-27 DIAGNOSIS — M67.819 TENDINOSIS OF ROTATOR CUFF: ICD-10-CM

## 2023-04-27 DIAGNOSIS — G89.29 CHRONIC LEFT SHOULDER PAIN: Primary | ICD-10-CM

## 2023-04-27 DIAGNOSIS — M25.512 CHRONIC LEFT SHOULDER PAIN: Primary | ICD-10-CM

## 2023-04-27 DIAGNOSIS — M75.42 ROTATOR CUFF IMPINGEMENT SYNDROME OF LEFT SHOULDER: ICD-10-CM

## 2023-04-27 PROCEDURE — 20610 DRAIN/INJ JOINT/BURSA W/O US: CPT | Mod: LT | Performed by: STUDENT IN AN ORGANIZED HEALTH CARE EDUCATION/TRAINING PROGRAM

## 2023-04-27 RX ORDER — LIDOCAINE HYDROCHLORIDE 10 MG/ML
4 INJECTION, SOLUTION INFILTRATION; PERINEURAL
Status: SHIPPED | OUTPATIENT
Start: 2023-04-27

## 2023-04-27 RX ORDER — TRIAMCINOLONE ACETONIDE 40 MG/ML
40 INJECTION, SUSPENSION INTRA-ARTICULAR; INTRAMUSCULAR
Status: SHIPPED | OUTPATIENT
Start: 2023-04-27

## 2023-04-27 RX ADMIN — TRIAMCINOLONE ACETONIDE 40 MG: 40 INJECTION, SUSPENSION INTRA-ARTICULAR; INTRAMUSCULAR at 15:24

## 2023-04-27 RX ADMIN — LIDOCAINE HYDROCHLORIDE 4 ML: 10 INJECTION, SOLUTION INFILTRATION; PERINEURAL at 15:24

## 2023-04-27 NOTE — PATIENT INSTRUCTIONS
1. Chronic left shoulder pain    2. Rotator cuff impingement syndrome of left shoulder    3. Tendinosis of rotator cuff      - Presenting for follow up of chronic bilateral (L>R) shoulder stiffness and pain. Symptoms have improved with a full course of formal physical therapy, but she continues to note limited mobility and pain of the left shoulder with overhead movement. Evaluation today is most consistent with ongoing rotator cuff impingement and suspected supraspinatus tendinosis. We thoroughly discussed further management options including ongoing activity modifications and home exercises, anti-inflammatories, trial of cortisone injection, platelet rich plasma (PRP) injection and timing of advanced imaging (MRI). Upon discussion, plan to proceed with the following:    - A left subacromial bursa cortisone injection was performed in clinic today without complication and with pain relief noted.   - Post-injection instructions provided below.  - May use ice for 10-15 minutes 3-4 times per day as needed for pain until cortisone kicks in.  - Advil as needed or pain.   - Take it easy for the next 2 weeks while the cortisone takes effect, then gradually increase activity as tolerated based on pain.    Return to clinic for follow up with Sports Medicine (ie my colleague Dr. Yeo) as needed for persistence or worsening of pain. This injection can be repeated after 4 months if needed or an MRI can be considered depending on course.     You may call our direct clinic number ([phone]) at any time with questions or concerns.    Edith Guerra MD, [facility]    JOINT INJECTION AFTERCARE    After any kind of joint injection, it is not uncommon to experience:  - Soreness, swelling or bruising around the injection site.  - Mild numbness, tingling or weakness around the injection site caused by the numbing medicine used before or with the injection.     It is also possible to  experience the following effects associated with the specific agent after injection:  - Allergic reaction.  - Increased blood sugar levels for 10-14 days following cortisone injection. If you have diabetes and notice that your blood sugar levels have increased, notify your primary care physician.  - Increased blood pressure levels.  - Mood swings.  - Increased fluid accumulation in the injected joint.    These effects all should resolve within a day or two of your procedure.    Please note that it may take up to 14 days for the steroid (cortisone) medication to start working.     HOME CARE INSTRUCTIONS    - Limit yourself to light activity activity on the day of your procedure. Avoid lifting heavy objects, bending, stooping or twisting.   - You may shower, but please avoid swimming, hot tubs or tub baths for 24 hours following the procedure.   - Take over-the-counter pain medication (NSAIDS or Tylenol) for pain control after your procedure as needed.    - You may use ice packs for 10-15 minutes 3-4 times per day at the injection site to reduce pain and swelling after your procedure.   + Put ice in a plastic bag  + Place a towel between your skin and the ice bag  + Leave the ice on for no longer than 20 minutes each time to avoid injuring your skin or nerves  + This can be repeated 3-4 times per day for a few days after the injection    SEEK IMMEDIATE MEDICAL CARE IF:    - Pain and swelling get worse rather than better or extend beyond the injection site  - Numbness does not go away after a few hours  - Blood or fluid continues to leak from the injection site  - You experience chest pain  - You have swelling of your face or tongue  - You have trouble breathing or become dizzy  - You develop fever, chills or severe tenderness at the injection site that lasts longer than 1 day    MAKE SURE YOU:    - Understand these instructions  - Watch your condition  - Get help right away if you are not doing well or if you get  worse

## 2023-04-27 NOTE — LETTER
4/27/2023         RE: Sury Castellanos  [address] [place] [address]        Dear Colleague,    Thank you for referring your patient, Sury Castellanos, to the [facility] [place]. Please see a copy of my visit note below.    ASSESSMENT & PLAN    1. Chronic left shoulder pain    2. Rotator cuff impingement syndrome of left shoulder    3. Tendinosis of rotator cuff      - Presenting for follow up of chronic bilateral (L>R) shoulder stiffness and pain. Symptoms have improved with a full course of formal physical therapy, but she continues to note limited mobility and pain of the left shoulder with overhead movement. Evaluation today is most consistent with ongoing rotator cuff impingement and suspected supraspinatus tendinosis. We thoroughly discussed further management options including ongoing activity modifications and home exercises, anti-inflammatories, trial of cortisone injection, platelet rich plasma (PRP) injection and timing of advanced imaging (MRI). Upon discussion, plan to proceed with the following:    - A left subacromial bursa cortisone injection was performed in clinic today without complication and with pain relief noted.   - Post-injection instructions provided below.  - May use ice for 10-15 minutes 3-4 times per day as needed for pain until cortisone kicks in.  - Advil as needed or pain.   - Take it easy for the next 2 weeks while the cortisone takes effect, then gradually increase activity as tolerated based on pain.    Return to clinic for follow up with Sports Medicine (ie my colleague Dr. Yeo) as needed for persistence or worsening of pain. This injection can be repeated after 4 months if needed or an MRI can be considered depending on course.     You may call our direct clinic number ([phone]) at any time with questions or concerns.    Edith Guerra MD, [facility]  "[facility]    -----    SUBJECTIVE:  Sury Castellanos is a 62 year old female who is seen in follow-up for bilateral shoulder pain. They were last seen 10/21/22.     Since their last visit reports 70% improvement. She still notes pain in bilateral lateral upper arms. She reports pain with pushing or singling arm activities / lifting. They indicate that their current pain level is 3/10. They have tried rest/activity avoidance, home exercises, physical therapy (6 visits), previous imaging (xray 10/21/22) and Advil PRN for severe pain.      The patient is seen by themselves.    Patient's past medical, surgical, social, and family histories were reviewed today and no changes are noted.    REVIEW OF SYSTEMS:  Constitutional: NEGATIVE for fever, chills, change in weight  Skin: NEGATIVE for worrisome rashes, moles or lesions  GI/: NEGATIVE for bowel or bladder changes  Neuro: NEGATIVE for weakness, dizziness or paresthesias    OBJECTIVE:  Ht 1.676 m (5' 6\")   Wt 74.8 kg (165 lb)   BMI 26.63 kg/m     General: healthy, alert and in no distress  HEENT: no scleral icterus or conjunctival erythema  Skin: no suspicious lesions or rash. No jaundice.  CV: regular rhythm by palpation, no pedal edema  Resp: normal respiratory effort without conversational dyspnea   Psych: normal mood and affect  Gait: normal steady gait with appropriate coordination and balance  Neuro: normal light touch sensory exam of the extremities.    MSK:  BILATERAL SHOULDER  Inspection:    no swelling, bruising, discoloration, or obvious deformity or asymmetry  Palpation:    Tender about the deltoid. Remainder of bony and tendinous landmarks are nontender.  Active/Passive Range of Motion:     Left: Abduction 165/1750, /1500, , IR T7.     Right: Abduction 1800, /1600, , IR T7.      Scapular dyskinesis absent  Strength:    Scapular plane abduction 5-/5 (painful left),  ER 5/5, IR 5/5, biceps 5/5, triceps 5/5  Special Tests:    Positive: " Nemat's, Snyder', painful arc    Negative: Speed's, Supraspinatus (empty can)    Independent visualization of the below image:  Results for orders placed or performed in visit on 10/21/22   XR Shoulder Bilateral G/E 2 Views    Narrative    SHOULDER BILATERAL TWO OR MORE VIEWS 10/21/2022 11:03 AM     INDICATION: Bilateral shoulder pain.     COMPARISON: None.        IMPRESSION:  1.  Right shoulder: Mild glenohumeral and acromioclavicular  degenerative arthrosis. No fracture.  2.  Left shoulder: Normal joint spacing and alignment. No fracture.    HAILEY YADAV MD        Large Joint Injection/Arthocentesis: L subacromial bursa    Date/Time: 4/27/2023 3:24 PM    Performed by: Edith White MD  Authorized by: Edith White MD    Indications:  Pain  Needle Size:  25 G  Guidance: landmark guided    Approach:  Posterior  Location:  Shoulder      Site:  L subacromial bursa  Medications:  40 mg triamcinolone 40 MG/ML; 4 mL lidocaine 1 %  Outcome:  Tolerated well, no immediate complications  Procedure discussed: discussed risks, benefits, and alternatives    Consent Given by:  Patient  Timeout: timeout called immediately prior to procedure    Prep: patient was prepped and draped in usual sterile fashion      Patient rated her pain as 3/10 pre-injection and 0/10 post-injection.    Edith Guerra MD, [facility]              Again, thank you for allowing me to participate in the care of your patient.        Sincerely,        Edith Guerra MD

## 2023-04-27 NOTE — PROGRESS NOTES
ASSESSMENT & PLAN    1. Chronic left shoulder pain    2. Rotator cuff impingement syndrome of left shoulder    3. Tendinosis of rotator cuff      - Presenting for follow up of chronic bilateral (L>R) shoulder stiffness and pain. Symptoms have improved with a full course of formal physical therapy, but she continues to note limited mobility and pain of the left shoulder with overhead movement. Evaluation today is most consistent with ongoing rotator cuff impingement and suspected supraspinatus tendinosis. We thoroughly discussed further management options including ongoing activity modifications and home exercises, anti-inflammatories, trial of cortisone injection, platelet rich plasma (PRP) injection and timing of advanced imaging (MRI). Upon discussion, plan to proceed with the following:    - A left subacromial bursa cortisone injection was performed in clinic today without complication and with pain relief noted.   - Post-injection instructions provided below.  - May use ice for 10-15 minutes 3-4 times per day as needed for pain until cortisone kicks in.  - Advil as needed or pain.   - Take it easy for the next 2 weeks while the cortisone takes effect, then gradually increase activity as tolerated based on pain.    Return to clinic for follow up with Sports Medicine (ie my colleague Dr. Yeo) as needed for persistence or worsening of pain. This injection can be repeated after 4 months if needed or an MRI can be considered depending on course.     You may call our direct clinic number ([phone]) at any time with questions or concerns.    Edith Guerra MD, [facility]    -----    SUBJECTIVE:  Sury Castellanos is a 62 year old female who is seen in follow-up for bilateral shoulder pain. They were last seen 10/21/22.     Since their last visit reports 70% improvement. She still notes pain in bilateral lateral upper arms. She reports pain with pushing or singling  "arm activities / lifting. They indicate that their current pain level is 3/10. They have tried rest/activity avoidance, home exercises, physical therapy (6 visits), previous imaging (xray 10/21/22) and Advil PRN for severe pain.      The patient is seen by themselves.    Patient's past medical, surgical, social, and family histories were reviewed today and no changes are noted.    REVIEW OF SYSTEMS:  Constitutional: NEGATIVE for fever, chills, change in weight  Skin: NEGATIVE for worrisome rashes, moles or lesions  GI/: NEGATIVE for bowel or bladder changes  Neuro: NEGATIVE for weakness, dizziness or paresthesias    OBJECTIVE:  Ht 1.676 m (5' 6\")   Wt 74.8 kg (165 lb)   BMI 26.63 kg/m     General: healthy, alert and in no distress  HEENT: no scleral icterus or conjunctival erythema  Skin: no suspicious lesions or rash. No jaundice.  CV: regular rhythm by palpation, no pedal edema  Resp: normal respiratory effort without conversational dyspnea   Psych: normal mood and affect  Gait: normal steady gait with appropriate coordination and balance  Neuro: normal light touch sensory exam of the extremities.    MSK:  BILATERAL SHOULDER  Inspection:    no swelling, bruising, discoloration, or obvious deformity or asymmetry  Palpation:    Tender about the deltoid. Remainder of bony and tendinous landmarks are nontender.  Active/Passive Range of Motion:     Left: Abduction 165/1750, /1500, , IR T7.     Right: Abduction 1800, /1600, , IR T7.      Scapular dyskinesis absent  Strength:    Scapular plane abduction 5-/5 (painful left),  ER 5/5, IR 5/5, biceps 5/5, triceps 5/5  Special Tests:    Positive: Neer's, Snyder', painful arc    Negative: Speed's, Supraspinatus (empty can)    Independent visualization of the below image:  Results for orders placed or performed in visit on 10/21/22   XR Shoulder Bilateral G/E 2 Views    Narrative    SHOULDER BILATERAL TWO OR MORE VIEWS 10/21/2022 11:03 AM "     INDICATION: Bilateral shoulder pain.     COMPARISON: None.        IMPRESSION:  1.  Right shoulder: Mild glenohumeral and acromioclavicular  degenerative arthrosis. No fracture.  2.  Left shoulder: Normal joint spacing and alignment. No fracture.    HAILEY YADAV MD        Large Joint Injection/Arthocentesis: L subacromial bursa    Date/Time: 4/27/2023 3:24 PM    Performed by: Edith White MD  Authorized by: Edith White MD    Indications:  Pain  Needle Size:  25 G  Guidance: landmark guided    Approach:  Posterior  Location:  Shoulder      Site:  L subacromial bursa  Medications:  40 mg triamcinolone 40 MG/ML; 4 mL lidocaine 1 %  Outcome:  Tolerated well, no immediate complications  Procedure discussed: discussed risks, benefits, and alternatives    Consent Given by:  Patient  Timeout: timeout called immediately prior to procedure    Prep: patient was prepped and draped in usual sterile fashion      Patient rated her pain as 3/10 pre-injection and 0/10 post-injection.    Edith Guerra MD, [facility] [place]

## 2023-05-01 NOTE — PROGRESS NOTES
Patient did not return for further treatment and no additional progress was noted.  Please refer to the progress note and goal flowsheet completed on 03/13/23 for discharge information.

## 2023-08-22 ENCOUNTER — OFFICE VISIT (OUTPATIENT)
Dept: ORTHOPEDICS | Facility: CLINIC | Age: 63
End: 2023-08-22
Payer: COMMERCIAL

## 2023-08-22 VITALS
DIASTOLIC BLOOD PRESSURE: 82 MMHG | HEIGHT: 66 IN | BODY MASS INDEX: 26.03 KG/M2 | SYSTOLIC BLOOD PRESSURE: 112 MMHG | WEIGHT: 162 LBS

## 2023-08-22 DIAGNOSIS — M75.41 IMPINGEMENT SYNDROME OF RIGHT SHOULDER: Primary | ICD-10-CM

## 2023-08-22 PROCEDURE — 99203 OFFICE O/P NEW LOW 30 MIN: CPT | Mod: 25 | Performed by: PHYSICIAN ASSISTANT

## 2023-08-22 PROCEDURE — 20610 DRAIN/INJ JOINT/BURSA W/O US: CPT | Mod: RT | Performed by: PHYSICIAN ASSISTANT

## 2023-08-22 RX ORDER — BUPIVACAINE HYDROCHLORIDE 5 MG/ML
2 INJECTION, SOLUTION PERINEURAL
Status: SHIPPED | OUTPATIENT
Start: 2023-08-22

## 2023-08-22 RX ORDER — LIDOCAINE HYDROCHLORIDE 10 MG/ML
2 INJECTION, SOLUTION INFILTRATION; PERINEURAL
Status: SHIPPED | OUTPATIENT
Start: 2023-08-22

## 2023-08-22 RX ORDER — TRIAMCINOLONE ACETONIDE 40 MG/ML
20 INJECTION, SUSPENSION INTRA-ARTICULAR; INTRAMUSCULAR
Status: SHIPPED | OUTPATIENT
Start: 2023-08-22

## 2023-08-22 RX ADMIN — LIDOCAINE HYDROCHLORIDE 2 ML: 10 INJECTION, SOLUTION INFILTRATION; PERINEURAL at 16:12

## 2023-08-22 RX ADMIN — TRIAMCINOLONE ACETONIDE 20 MG: 40 INJECTION, SUSPENSION INTRA-ARTICULAR; INTRAMUSCULAR at 16:12

## 2023-08-22 RX ADMIN — BUPIVACAINE HYDROCHLORIDE 2 ML: 5 INJECTION, SOLUTION PERINEURAL at 16:12

## 2023-08-22 NOTE — LETTER
8/22/2023         RE: Sury Castellanos  [address] [place] [address]        Dear Colleague,    Thank you for referring your patient, Sury Castellanos, to the [facility] [place]. Please see a copy of my visit note below.    ASSESSMENT & PLAN  1. Impingement syndrome of right shoulder    2.  History of left shoulder impingement- improved and stable with previous subacromial CSI and PT     Today we discussed the underlying etiology/pathology of patient's right shoulder impingement/rotator cuff tendinosis  We discussed treatment options and patient wished to proceed with right shoulder subacromial cortisone injection which was done today without difficulty.  Patient will try to avoid overhead activities above shoulder height to minimize aggravation and will continue home exercise program with Thera-Band's to continue bilateral shoulder strengthening program.  Patient may return to normal activities as she tolerates over the next 3-5 days including pickleball.  If patient fails to improve repeat CSI could be done in the future or possible advanced imaging of MRI to determine severity of impingement and status of rotator cuff.  Patient was instructed on utilizing over-the-counter NSAIDs including topical Voltaren 3 times a day as directed or Biofreeze or Tiger balm for symptomatic relief.  -----    SUBJECTIVE  Sury Castellanos is a/an 62 year old Right handed female who is seen as a self referral for evaluation of right shoulder pain. The patient is seen by themselves.    Onset: few years(s) ago. Reports insidious onset without acute precipitating event. Patient got a steroid shot in other shoulder 4 months ago and has been to physical therapy with significant improvement of her left and right shoulder symptoms. She feels her right shoulder might have gotten worse when doing house work, golf, pickleball or Pilates. She feels that aggravated the shoulder to the point of  wanting an injection today.  Location of Pain: right lateral deltoid tuberosity and bicep   Rating of Pain at worst: 7/10  Rating of Pain Currently: 4/10  Worsened by: OH motions, shoulder extension, and  abduction  Better with: rest  Treatments tried: corticosteroid injection (most recent date: 2023) that provided relief in LEFT shoulder,   Associated symptoms: locking or catching (snapping with rubber band)   Orthopedic history: NO  Relevant surgical history: NO  Social history: social history: retired    Past Medical History:   Diagnosis Date     Eczema, unspecified type 2018     Social History     Socioeconomic History     Marital status:    Tobacco Use     Smoking status: Never     Smokeless tobacco: Never   Vaping Use     Vaping Use: Never used   Substance and Sexual Activity     Alcohol use: Yes     Comment: 2-3 times a month, 2 at a time     Drug use: No     Sexual activity: Not Currently   Social History Narrative      suddenly in his sleep 4 years ago. Has three daughters. 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    First grandchild due in 3 weeks, girl.     Enjoys pickle ball, golf, pilates.        Yoselin Trejo, DNP, APRN, CNP    22         Social Determinants of Health     Financial Resource Strain: Low Risk  (2022)    Overall Financial Resource Strain (CARDIA)      Difficulty of Paying Living Expenses: Not hard at all   Food Insecurity: No Food Insecurity (2022)    Hunger Vital Sign      Worried About Running Out of Food in the Last Year: Never true      Ran Out of Food in the Last Year: Never true   Transportation Needs: No Transportation Needs (2022)    PRAPARE - Transportation      Lack of Transportation (Medical): No      Lack of Transportation (Non-Medical): No   Physical Activity: Sufficiently Active (2022)    Exercise Vital Sign      Days of Exercise per Week: 5 days      Minutes of  "Exercise per Session: 60 min   Stress: No Stress Concern Present (9/27/2022)    [place] [employer] - Occupational Stress Questionnaire      Feeling of Stress : Only a little   Social Connections: Moderately Integrated (9/27/2022)    Social Connection and Isolation Panel [NHANES]      Frequency of Communication with Friends and Family: More than three times a week      Frequency of Social Gatherings with Friends and Family: More than three times a week      Attends Gnosticism Services: 1 to 4 times per year      Active Member of Clubs or Organizations: Yes      Marital Status:    Housing Stability: Low Risk  (9/27/2022)    Housing Stability Vital Sign      Unable to Pay for Housing in the Last Year: No      Number of Places Lived in the Last Year: 1      Unstable Housing in the Last Year: No         Patient's past medical, surgical, social, and family histories were reviewed today and no changes are noted.    REVIEW OF SYSTEMS:  10 point ROS is negative other than symptoms noted above in HPI, Past Medical History or as stated below  Constitutional: NEGATIVE for fever, chills, change in weight  Skin: NEGATIVE for worrisome rashes, moles or lesions  GI/: NEGATIVE for bowel or bladder changes  Neuro: NEGATIVE for weakness, dizziness or paresthesias    OBJECTIVE:  Ht 1.676 m (5' 6\")   BMI 26.63 kg/m     General: healthy, alert and in no distress  HEENT: no scleral icterus or conjunctival erythema  Skin: no suspicious lesions or rash. No jaundice.  CV: regular rhythm by palpation  Resp: normal respiratory effort without conversational dyspnea   Psych: normal mood and affect  Gait: normal steady gait with appropriate coordination and balance  Neuro: normal light touch sensory exam of the bilateral upper extremities.    MSK:    Examination of the patient's right shoulder shows no bruising, no swelling or ecchymotic changes.  Normal muscle tone symmetrically.  Shoulders are at equal height.  " Scapula tracks properly.  Patient is nontender over the sternoclavicular joint, clavicle and AC joint.  Mild tenderness in the anterior subacromial space.  Positive impingement testing with Snyder.  Rotator cuff strength is 5 out of 5 with adduction, abduction, flexion and rotation.  Patient can terminally reach 180 degrees of terminal flexion and abduction and internal rotation to the T11 spinous process bilaterally.  Sulcus sign is negative.  Patient is neurovascularly intact throughout both upper extremities.  No upper extremity edema.  Radial pulses are within normal limits.  Normal cervical spine motion without tenderness.  Independent visualization of the below image:  No results found for this or any previous visit (from the past 24 hour(s)).    Previous x-rays of the patient's bilateral shoulders from 10/21/2022 was personally reviewed today.  Findings show mild glenohumeral and AC joint DJD.    Large Joint Injection/Arthocentesis: R subacromial bursa    Date/Time: 8/22/2023 4:12 PM    Performed by: Wallace Ivan PA-C  Authorized by: Wallace Ivan PA-C    Indications:  Pain  Needle Size:  25 G  Guidance: landmark guided    Approach:  Posterior  Location:  Shoulder      Site:  R subacromial bursa  Medications:  2 mL lidocaine 1 %; 2 mL BUPivacaine 0.5 %; 20 mg triamcinolone 40 MG/ML  Outcome:  Tolerated well, no immediate complications  Procedure discussed: discussed risks, benefits, and alternatives    Consent Given by:  Patient  Timeout: timeout called immediately prior to procedure    Prep: patient was prepped and draped in usual sterile fashion     The risks, benefits and complications of steroid injection were discussed with the patient (including but not limited to: bleeding, infection, pain, scar, damage to adjacent structures, atrophy or necrosis of soft tissue, skin blanching, failure to relieve symptoms, worsening of symptoms, allergic reaction). After this discussion all questions were  addressed and answered and the patient elected to proceed. The patient tolerated the procedure well without complications.  Also discussed that if diabetic, recommend close monitoring of blood sugars over the next week as cortisone injections can temporarily elevate blood sugars.        Patient's conditions were thoroughly discussed during today's visit with total time spent face-to-face with the patient and documentation being 20 minutes.    Wallace Ivan PA-C  [facility]      Again, thank you for allowing me to participate in the care of your patient.        Sincerely,        Wallace Ivan PA-C

## 2023-08-22 NOTE — PATIENT INSTRUCTIONS
1. Impingement syndrome of right shoulder    2.  History of left shoulder impingement- improved and stable with previous subacromial CSI and PT     Today we discussed the underlying etiology/pathology of patient's right shoulder impingement/rotator cuff tendinosis  We discussed treatment options and patient wished to proceed with right shoulder subacromial cortisone injection which was done today without difficulty.  Patient will try to avoid overhead activities above shoulder height to minimize aggravation and will continue home exercise program with Thera-Band's to continue bilateral shoulder strengthening program.  Patient may return to normal activities as she tolerates over the next 3-5 days including pickleball.  If patient fails to improve repeat CSI could be done in the future or possible advanced imaging of MRI to determine severity of impingement and status of rotator cuff.  Patient was instructed on utilizing over-the-counter NSAIDs including topical Voltaren 3 times a day as directed or Biofreeze or Tiger balm for symptomatic relief.      -Call direct clinic number [[phone]] at any time with questions or concerns in regards to your recent office visit with me.     Wallace Ivan PA-C  [facility]

## 2023-08-22 NOTE — PROGRESS NOTES
ASSESSMENT & PLAN  1. Impingement syndrome of right shoulder    2.  History of left shoulder impingement- improved and stable with previous subacromial CSI and PT     Today we discussed the underlying etiology/pathology of patient's right shoulder impingement/rotator cuff tendinosis  We discussed treatment options and patient wished to proceed with right shoulder subacromial cortisone injection which was done today without difficulty.  Patient will try to avoid overhead activities above shoulder height to minimize aggravation and will continue home exercise program with Thera-Band's to continue bilateral shoulder strengthening program.  Patient may return to normal activities as she tolerates over the next 3-5 days including pickleball.  If patient fails to improve repeat CSI could be done in the future or possible advanced imaging of MRI to determine severity of impingement and status of rotator cuff.  Patient was instructed on utilizing over-the-counter NSAIDs including topical Voltaren 3 times a day as directed or Biofreeze or Tiger balm for symptomatic relief.  -----    SUBJECTIVE  Sury Castellanos is a/an 62 year old Right handed female who is seen as a self referral for evaluation of right shoulder pain. The patient is seen by themselves.    Onset: few years(s) ago. Reports insidious onset without acute precipitating event. Patient got a steroid shot in other shoulder 4 months ago and has been to physical therapy with significant improvement of her left and right shoulder symptoms. She feels her right shoulder might have gotten worse when doing house work, golf, pickleball or Pilates. She feels that aggravated the shoulder to the point of wanting an injection today.  Location of Pain: right lateral deltoid tuberosity and bicep   Rating of Pain at worst: 7/10  Rating of Pain Currently: 4/10  Worsened by: OH motions, shoulder extension, and  abduction  Better with: rest  Treatments tried: corticosteroid  injection (most recent date: 2023) that provided relief in LEFT shoulder,   Associated symptoms: locking or catching (snapping with rubber band)   Orthopedic history: NO  Relevant surgical history: NO  Social history: social history: retired    Past Medical History:   Diagnosis Date    Eczema, unspecified type 2018     Social History     Socioeconomic History    Marital status:    Tobacco Use    Smoking status: Never    Smokeless tobacco: Never   Vaping Use    Vaping Use: Never used   Substance and Sexual Activity    Alcohol use: Yes     Comment: 2-3 times a month, 2 at a time    Drug use: No    Sexual activity: Not Currently   Social History Narrative      suddenly in his sleep 4 years ago. Has three daughters. 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    First grandchild due in 3 weeks, girl.     Enjoys pickle ball, golf, pilates.        Yoselin Trejo, DNP, APRN, CNP    22         Social Determinants of Health     Financial Resource Strain: Low Risk  (2022)    Overall Financial Resource Strain (CARDIA)     Difficulty of Paying Living Expenses: Not hard at all   Food Insecurity: No Food Insecurity (2022)    Hunger Vital Sign     Worried About Running Out of Food in the Last Year: Never true     Ran Out of Food in the Last Year: Never true   Transportation Needs: No Transportation Needs (2022)    PRAPARE - Transportation     Lack of Transportation (Medical): No     Lack of Transportation (Non-Medical): No   Physical Activity: Sufficiently Active (2022)    Exercise Vital Sign     Days of Exercise per Week: 5 days     Minutes of Exercise per Session: 60 min   Stress: No Stress Concern Present (2022)    [place] [employer] - Occupational Stress Questionnaire     Feeling of Stress : Only a little   Social Connections: Moderately Integrated (2022)    Social Connection and  "Isolation Panel [NHANES]     Frequency of Communication with Friends and Family: More than three times a week     Frequency of Social Gatherings with Friends and Family: More than three times a week     Attends Ep[employer]opalian Services: 1 to 4 times per year     Active Member of Clubs or Organizations: Yes     Marital Status:    Housing Stability: Low Risk  (9/27/2022)    Housing Stability Vital Sign     Unable to Pay for Housing in the Last Year: No     Number of Places Lived in the Last Year: 1     Unstable Housing in the Last Year: No         Patient's past medical, surgical, social, and family histories were reviewed today and no changes are noted.    REVIEW OF SYSTEMS:  10 point ROS is negative other than symptoms noted above in HPI, Past Medical History or as stated below  Constitutional: NEGATIVE for fever, chills, change in weight  Skin: NEGATIVE for worrisome rashes, moles or lesions  GI/: NEGATIVE for bowel or bladder changes  Neuro: NEGATIVE for weakness, dizziness or paresthesias    OBJECTIVE:  Ht 1.676 m (5' 6\")   BMI 26.63 kg/m     General: healthy, alert and in no distress  HEENT: no scleral icterus or conjunctival erythema  Skin: no suspicious lesions or rash. No jaundice.  CV: regular rhythm by palpation  Resp: normal respiratory effort without conversational dyspnea   Psych: normal mood and affect  Gait: normal steady gait with appropriate coordination and balance  Neuro: normal light touch sensory exam of the bilateral upper extremities.    MSK:    Examination of the patient's right shoulder shows no bruising, no swelling or ecchymotic changes.  Normal muscle tone symmetrically.  Shoulders are at equal height.  Scapula tracks properly.  Patient is nontender over the sternoclavicular joint, clavicle and AC joint.  Mild tenderness in the anterior subacromial space.  Positive impingement testing with Snyder.  Rotator cuff strength is 5 out of 5 with adduction, abduction, flexion and rotation.  " Patient can terminally reach 180 degrees of terminal flexion and abduction and internal rotation to the T11 spinous process bilaterally.  Sulcus sign is negative.  Patient is neurovascularly intact throughout both upper extremities.  No upper extremity edema.  Radial pulses are within normal limits.  Normal cervical spine motion without tenderness.  Independent visualization of the below image:  No results found for this or any previous visit (from the past 24 hour(s)).    Previous x-rays of the patient's bilateral shoulders from 10/21/2022 was personally reviewed today.  Findings show mild glenohumeral and AC joint DJD.    Large Joint Injection/Arthocentesis: R subacromial bursa    Date/Time: 8/22/2023 4:12 PM    Performed by: Wallace Ivan PA-C  Authorized by: Wallace Ivan PA-C    Indications:  Pain  Needle Size:  25 G  Guidance: landmark guided    Approach:  Posterior  Location:  Shoulder      Site:  R subacromial bursa  Medications:  2 mL lidocaine 1 %; 2 mL BUPivacaine 0.5 %; 20 mg triamcinolone 40 MG/ML  Outcome:  Tolerated well, no immediate complications  Procedure discussed: discussed risks, benefits, and alternatives    Consent Given by:  Patient  Timeout: timeout called immediately prior to procedure    Prep: patient was prepped and draped in usual sterile fashion     The risks, benefits and complications of steroid injection were discussed with the patient (including but not limited to: bleeding, infection, pain, scar, damage to adjacent structures, atrophy or necrosis of soft tissue, skin blanching, failure to relieve symptoms, worsening of symptoms, allergic reaction). After this discussion all questions were addressed and answered and the patient elected to proceed. The patient tolerated the procedure well without complications.  Also discussed that if diabetic, recommend close monitoring of blood sugars over the next week as cortisone injections can temporarily elevate blood  sugars.        Patient's conditions were thoroughly discussed during today's visit with total time spent face-to-face with the patient and documentation being 20 minutes.    Wallace Ivan PA-C  [facility]

## 2023-08-29 ENCOUNTER — PATIENT OUTREACH (OUTPATIENT)
Dept: CARE COORDINATION | Facility: CLINIC | Age: 63
End: 2023-08-29
Payer: COMMERCIAL

## 2023-09-26 ENCOUNTER — PATIENT OUTREACH (OUTPATIENT)
Dept: CARE COORDINATION | Facility: CLINIC | Age: 63
End: 2023-09-26
Payer: COMMERCIAL

## 2023-10-02 ENCOUNTER — ANCILLARY PROCEDURE (OUTPATIENT)
Dept: MAMMOGRAPHY | Facility: CLINIC | Age: 63
End: 2023-10-02
Attending: NURSE PRACTITIONER
Payer: COMMERCIAL

## 2023-10-02 DIAGNOSIS — Z12.31 VISIT FOR SCREENING MAMMOGRAM: ICD-10-CM

## 2023-10-02 PROCEDURE — 77067 SCR MAMMO BI INCL CAD: CPT | Mod: TC | Performed by: RADIOLOGY

## 2023-10-02 PROCEDURE — 77063 BREAST TOMOSYNTHESIS BI: CPT | Mod: TC | Performed by: RADIOLOGY

## 2023-11-04 ENCOUNTER — HEALTH MAINTENANCE LETTER (OUTPATIENT)
Age: 63
End: 2023-11-04

## 2023-12-21 ENCOUNTER — E-VISIT (OUTPATIENT)
Dept: URGENT CARE | Facility: CLINIC | Age: 63
End: 2023-12-21
Payer: COMMERCIAL

## 2023-12-21 DIAGNOSIS — J01.90 ACUTE SINUSITIS WITH SYMPTOMS > 10 DAYS: Primary | ICD-10-CM

## 2023-12-21 PROCEDURE — 99421 OL DIG E/M SVC 5-10 MIN: CPT | Performed by: FAMILY MEDICINE

## 2023-12-21 RX ORDER — DOXYCYCLINE HYCLATE 100 MG
100 TABLET ORAL 2 TIMES DAILY
Qty: 14 TABLET | Refills: 0 | Status: SHIPPED | OUTPATIENT
Start: 2023-12-21 | End: 2023-12-28

## 2023-12-21 NOTE — PATIENT INSTRUCTIONS
Thank you for choosing us for your care. I have placed an order for a prescription so that you can start treatment. View your full visit summary for details by clicking on the link below. Your pharmacist will able to address any questions you may have about the medication.     If you're not feeling better within 5-7 days, please schedule an appointment.  You can schedule an appointment right here in [facility], or call [phone]  If the visit is for the same symptoms as your eVisit, we'll refund the cost of your eVisit if seen within seven days.

## 2024-01-07 ENCOUNTER — E-VISIT (OUTPATIENT)
Dept: URGENT CARE | Facility: CLINIC | Age: 64
End: 2024-01-07
Payer: COMMERCIAL

## 2024-01-07 DIAGNOSIS — R05.9 COUGH, UNSPECIFIED TYPE: Primary | ICD-10-CM

## 2024-01-07 PROCEDURE — 99207 PR NON-BILLABLE SERV PER CHARTING: CPT | Performed by: INTERNAL MEDICINE

## 2024-01-07 NOTE — PATIENT INSTRUCTIONS
Dear Sury Castellanos,    We are sorry you are not feeling well. Based on the responses you provided, it is recommended that you be seen in-person with your primary doctor or  in urgent care so we can better evaluate your symptoms, listen to your lungs, and be sure we have the best treatment for you.  Please click here to find the nearest urgent care location to you.   You will not be charged for this Visit. Thank you for trusting us with your care.    Anitha Ibrahim MD

## 2024-01-19 ENCOUNTER — ANCILLARY PROCEDURE (OUTPATIENT)
Dept: GENERAL RADIOLOGY | Facility: CLINIC | Age: 64
End: 2024-01-19
Attending: INTERNAL MEDICINE
Payer: COMMERCIAL

## 2024-01-19 ENCOUNTER — OFFICE VISIT (OUTPATIENT)
Dept: INTERNAL MEDICINE | Facility: CLINIC | Age: 64
End: 2024-01-19
Payer: COMMERCIAL

## 2024-01-19 VITALS
SYSTOLIC BLOOD PRESSURE: 124 MMHG | BODY MASS INDEX: 26.24 KG/M2 | HEART RATE: 69 BPM | OXYGEN SATURATION: 98 % | DIASTOLIC BLOOD PRESSURE: 77 MMHG | HEIGHT: 66 IN | RESPIRATION RATE: 16 BRPM | WEIGHT: 163.3 LBS | TEMPERATURE: 98.3 F

## 2024-01-19 DIAGNOSIS — R05.3 PERSISTENT COUGH: ICD-10-CM

## 2024-01-19 DIAGNOSIS — J30.9 ALLERGIC RHINITIS WITH POSTNASAL DRIP: Primary | ICD-10-CM

## 2024-01-19 DIAGNOSIS — R09.82 ALLERGIC RHINITIS WITH POSTNASAL DRIP: Primary | ICD-10-CM

## 2024-01-19 PROCEDURE — 71046 X-RAY EXAM CHEST 2 VIEWS: CPT | Mod: TC | Performed by: RADIOLOGY

## 2024-01-19 PROCEDURE — 99213 OFFICE O/P EST LOW 20 MIN: CPT | Performed by: INTERNAL MEDICINE

## 2024-01-19 RX ORDER — METHYLPREDNISOLONE 4 MG
TABLET, DOSE PACK ORAL
Qty: 21 TABLET | Refills: 0 | Status: SHIPPED | OUTPATIENT
Start: 2024-01-19 | End: 2024-03-28

## 2024-01-19 NOTE — PROGRESS NOTES
"  Assessment & Plan     Allergic rhinitis with postnasal drip  Advised OTC Claritin and Flonase  Stop Afrin spray    Persistent cough  Chest x-ray ordered  Medrol Dosepak prescribed to use if no better              BMI  Estimated body mass index is 26.36 kg/m  as calculated from the following:    Height as of this encounter: 1.676 m (5' 6\").    Weight as of this encounter: 74.1 kg (163 lb 4.8 oz).           Mayuri Ga is a 63 year old, presenting for the following health issues:  Cough    History of Present Illness       Reason for visit:  Continued cough over 3 weeks- i have had a cold and sinus condition since December 12th, pt denies any exposure to covid/flu/strep/RSV, negative covid test during first week of symptoms  Symptom onset:  More than a month  Symptoms include:  Cough with some production, congestion, no fever, headache, sinus pressure, body aches  Symptom intensity:  Moderate  Symptom progression:  Staying the same  Prior treatment description:  Round of doxycycline in Dec -    She eats 2-3 servings of fruits and vegetables daily.She consumes 1 sweetened beverage(s) daily.She exercises with enough effort to increase her heart rate 20 to 29 minutes per day.  She exercises with enough effort to increase her heart rate 4 days per week.   She is taking medications regularly.       Finished doxycycline.  Complaining of persistent cough with occasional shortness of breath without any wheeze .    has a sensation of postnasal drip.    Has been using OTC Afrin spray.                Review of Systems  Constitutional, neuro, ENT, endocrine, pulmonary, cardiac, gastrointestinal, genitourinary, musculoskeletal, integument and psychiatric systems are negative, except as otherwise noted.      Objective    /77   Pulse 69   Temp 98.3  F (36.8  C) (Temporal)   Resp 16   Ht 1.676 m (5' 6\")   Wt 74.1 kg (163 lb 4.8 oz)   SpO2 98%   BMI 26.36 kg/m    Body mass index is 26.36 kg/m .  Physical Exam "   GENERAL: alert and no distress  NECK: no adenopathy, no asymmetry, masses, or scars  RESP: lungs clear to auscultation - no rales, rhonchi or wheezes  CV: regular rate and rhythm, normal S1 S2, no S3 or S4, no murmur, click or rub, no peripheral edema  ABDOMEN: soft, nontender, no hepatosplenomegaly, no masses and bowel sounds normal  MS: no gross musculoskeletal defects noted, no edema            Signed Electronically by: Andrea Calderon MD

## 2024-03-14 ENCOUNTER — MYC MEDICAL ADVICE (OUTPATIENT)
Dept: PEDIATRICS | Facility: CLINIC | Age: 64
End: 2024-03-14
Payer: COMMERCIAL

## 2024-03-14 DIAGNOSIS — Z00.00 ROUTINE GENERAL MEDICAL EXAMINATION AT A HEALTH CARE FACILITY: Primary | ICD-10-CM

## 2024-03-26 ENCOUNTER — LAB (OUTPATIENT)
Dept: LAB | Facility: CLINIC | Age: 64
End: 2024-03-26
Payer: COMMERCIAL

## 2024-03-26 DIAGNOSIS — Z00.00 ROUTINE GENERAL MEDICAL EXAMINATION AT A HEALTH CARE FACILITY: ICD-10-CM

## 2024-03-26 LAB
ALBUMIN SERPL BCG-MCNC: 4.3 G/DL (ref 3.5–5.2)
ALP SERPL-CCNC: 80 U/L (ref 40–150)
ALT SERPL W P-5'-P-CCNC: 24 U/L (ref 0–50)
ANION GAP SERPL CALCULATED.3IONS-SCNC: 10 MMOL/L (ref 7–15)
AST SERPL W P-5'-P-CCNC: 24 U/L (ref 0–45)
BILIRUB SERPL-MCNC: 0.4 MG/DL
BUN SERPL-MCNC: 15 MG/DL (ref 8–23)
CALCIUM SERPL-MCNC: 9.1 MG/DL (ref 8.8–10.2)
CHLORIDE SERPL-SCNC: 104 MMOL/L (ref 98–107)
CHOLEST SERPL-MCNC: 160 MG/DL
CREAT SERPL-MCNC: 0.96 MG/DL (ref 0.51–0.95)
DEPRECATED HCO3 PLAS-SCNC: 26 MMOL/L (ref 22–29)
EGFRCR SERPLBLD CKD-EPI 2021: 66 ML/MIN/1.73M2
FASTING STATUS PATIENT QL REPORTED: YES
GLUCOSE SERPL-MCNC: 91 MG/DL (ref 70–99)
HDLC SERPL-MCNC: 51 MG/DL
LDLC SERPL CALC-MCNC: 92 MG/DL
NONHDLC SERPL-MCNC: 109 MG/DL
POTASSIUM SERPL-SCNC: 4.4 MMOL/L (ref 3.4–5.3)
PROT SERPL-MCNC: 7 G/DL (ref 6.4–8.3)
SODIUM SERPL-SCNC: 140 MMOL/L (ref 135–145)
TRIGL SERPL-MCNC: 84 MG/DL

## 2024-03-26 PROCEDURE — 36415 COLL VENOUS BLD VENIPUNCTURE: CPT

## 2024-03-26 PROCEDURE — 80053 COMPREHEN METABOLIC PANEL: CPT

## 2024-03-26 PROCEDURE — 80061 LIPID PANEL: CPT

## 2024-03-28 ENCOUNTER — OFFICE VISIT (OUTPATIENT)
Dept: PEDIATRICS | Facility: CLINIC | Age: 64
End: 2024-03-28
Payer: COMMERCIAL

## 2024-03-28 VITALS
WEIGHT: 165 LBS | TEMPERATURE: 97 F | HEART RATE: 74 BPM | BODY MASS INDEX: 26.52 KG/M2 | RESPIRATION RATE: 16 BRPM | OXYGEN SATURATION: 97 % | HEIGHT: 66 IN | SYSTOLIC BLOOD PRESSURE: 120 MMHG | DIASTOLIC BLOOD PRESSURE: 60 MMHG

## 2024-03-28 DIAGNOSIS — Z00.00 ENCOUNTER FOR PREVENTATIVE ADULT HEALTH CARE EXAMINATION: Primary | ICD-10-CM

## 2024-03-28 PROCEDURE — 99396 PREV VISIT EST AGE 40-64: CPT | Performed by: NURSE PRACTITIONER

## 2024-03-28 SDOH — HEALTH STABILITY: PHYSICAL HEALTH: ON AVERAGE, HOW MANY DAYS PER WEEK DO YOU ENGAGE IN MODERATE TO STRENUOUS EXERCISE (LIKE A BRISK WALK)?: 5 DAYS

## 2024-03-28 SDOH — HEALTH STABILITY: PHYSICAL HEALTH: ON AVERAGE, HOW MANY MINUTES DO YOU ENGAGE IN EXERCISE AT THIS LEVEL?: 50 MIN

## 2024-03-28 ASSESSMENT — PAIN SCALES - GENERAL: PAINLEVEL: NO PAIN (0)

## 2024-03-28 ASSESSMENT — SOCIAL DETERMINANTS OF HEALTH (SDOH): HOW OFTEN DO YOU GET TOGETHER WITH FRIENDS OR RELATIVES?: MORE THAN THREE TIMES A WEEK

## 2024-03-28 NOTE — PROGRESS NOTES
"Preventive Care Visit  [facility] JENARO Villatoro CNP, Family Medicine  Mar 28, 2024      Assessment & Plan     Encounter for preventative adult health care examination  Age appropriate screening and preventative care have been addressed today. Vaccinations have been reviewed, she will consider COVID booster and RSV later this year. Patient has been advised to follow a balanced diet with adequate calcium. They have been advised to undertake routine aerobic activity. Colonoscopy has been reviewed and is up to date at this time. Recommend annual vision exams as well as biannual dental exams. They will follow up for annual physical again in one year.   - Colonoscopy 5/5/2022, repeat in 5 years.   - Mammo utd  - Pap utd  - DEXA never done, consider at age 65      BMI  Estimated body mass index is 26.63 kg/m  as calculated from the following:    Height as of this encounter: 1.676 m (5' 6\").    Weight as of this encounter: 74.8 kg (165 lb).   Weight management plan: Discussed healthy diet and exercise guidelines    Counseling  Appropriate preventive services were discussed with this patient, including applicable screening as appropriate for fall prevention, nutrition, physical activity, Tobacco-use cessation, weight loss and cognition.  Checklist reviewing preventive services available has been given to the patient.  Reviewed patient's diet, addressing concerns and/or questions.       Mayuri Ga is a 63 year old, presenting for the following:  Physical        3/28/2024     1:40 PM   Additional Questions   Roomed by Cathleen SWIFT   Accompanied by Self         3/28/2024     1:40 PM   Patient Reported Additional Medications   Patient reports taking the following new medications n/a        Health Care Directive  Patient does not have a Health Care Directive or Living Will: Patient states has Advance Directive and will bring in a copy to clinic.    HPI      Colonoscopy 5/5/2022, repeat in 5 " years.   Mammo utd  Pap utd  DEXA never done.     Achy joints. Both shoulders. Feels more muscular. Saw sports med. Had injections. That helped.  Working with chiro. Bought a new bed.    Had some URIs this winter, lasted a long time, 7-8 weeks. Has two young grandchildren.     Active with pilates and pickle ball 2-3 times weekly.     The 10-year ASCVD risk score (Mary LUEVANO, et al., 2019) is: 3.6%    Values used to calculate the score:      Age: 63 years      Sex: Female      Is Non- : No      Diabetic: No      Tobacco smoker: No      Systolic Blood Pressure: 120 mmHg      Is BP treated: No      HDL Cholesterol: 51 mg/dL      Total Cholesterol: 160 mg/dL          3/28/2024   General Health   How would you rate your overall physical health? Good   Feel stress (tense, anxious, or unable to sleep) Not at all         3/28/2024   Nutrition   Three or more servings of calcium each day? Yes   Diet: Regular (no restrictions)   How many servings of fruit and vegetables per day? (!) 2-3   How many sweetened beverages each day? 0-1         3/28/2024   Exercise   Days per week of moderate/strenous exercise 5 days   Average minutes spent exercising at this level 50 min         3/28/2024   Social Factors   Frequency of gathering with friends or relatives More than three times a week   Worry food won't last until get money to buy more No   Food not last or not have enough money for food? No   Do you have housing?  Yes   Are you worried about losing your housing? No   Lack of transportation? No   Unable to get utilities (heat,electricity)? No         3/28/2024   Dental   Dentist two times every year? Yes         3/28/2024   TB Screening   Were you born outside of the [place]? No           Today's PHQ-2 Score:       1/19/2024    12:22 PM   PHQ-2 ( 1999 [employer])   Q1: Little interest or pleasure in doing things 0   Q2: Feeling down, depressed or hopeless 0   PHQ-2 Score 0   Q1: Little interest or pleasure in doing  things Not at all   Q2: Feeling down, depressed or hopeless Not at all   PHQ-2 Score 0         3/28/2024   Substance Use   Alcohol more than 3/day or more than 7/wk No   Do you use any other substances recreationally? No     Social History     Tobacco Use    Smoking status: Never     Passive exposure: Never    Smokeless tobacco: Never   Vaping Use    Vaping Use: Never used   Substance Use Topics    Alcohol use: Not Currently     Comment: 1-3 drinks a month    Drug use: No           10/2/2023   LAST FHS-7 RESULTS   1st degree relative breast or ovarian cancer Yes   Any ONE woman have BOTH breast AND ovarian cancer No        Mammogram Screening - Mammogram every 1-2 years updated in Health Maintenance based on mutual decision making        3/28/2024   STI Screening   New sexual partner(s) since last STI/HIV test? No     History of abnormal Pap smear: NO - age 30-65 PAP every 5 years with negative HPV co-testing recommended        Latest Ref Rng & Units 9/28/2022    10:18 AM 2/23/2018     8:40 AM 2/23/2018     8:39 AM   PAP / HPV   PAP  Negative for Intraepithelial Lesion or Malignancy (NILM)      PAP (Historical)    NIL    HPV 16 DNA Negative Negative  Negative     HPV 18 DNA Negative Negative  Negative     Other HR HPV Negative Negative  Negative       ASCVD Risk   The 10-year ASCVD risk score (Mary LUEVANO, et al., 2019) is: 3.6%    Values used to calculate the score:      Age: 63 years      Sex: Female      Is Non- : No      Diabetic: No      Tobacco smoker: No      Systolic Blood Pressure: 120 mmHg      Is BP treated: No      HDL Cholesterol: 51 mg/dL      Total Cholesterol: 160 mg/dL           Reviewed and updated as needed this visit by Provider                    Patient Active Problem List   Diagnosis    Family history of malignant neoplasm of breast    Eczema, unspecified type     Past Surgical History:   Procedure Laterality Date    GYN SURGERY      uterine polyp removal      "  Social History     Tobacco Use    Smoking status: Never     Passive exposure: Never    Smokeless tobacco: Never   Substance Use Topics    Alcohol use: Not Currently     Comment: 1-3 drinks a month     Family History   Problem Relation Age of Onset    Lung Cancer Father         smoker    Breast Cancer Mother 58    Breast Cancer Sister 40              Objective    Exam  /60   Pulse 74   Temp 97  F (36.1  C) (Tympanic)   Resp 16   Ht 1.676 m (5' 6\")   Wt 74.8 kg (165 lb)   SpO2 97%   BMI 26.63 kg/m     Estimated body mass index is 26.63 kg/m  as calculated from the following:    Height as of this encounter: 1.676 m (5' 6\").    Weight as of this encounter: 74.8 kg (165 lb).    Physical Exam  Constitutional: appears to be in no acute distress, comfortable, pleasant.   Eyes: anicteric, conjunctiva clear without drainage or erythema. HENRY.   Ears, Nose and Throat: tympanic membranes gray with LR,  nose without nasal discharge. OP: no erythema to posterior pharynx, negative post nasal drainage, tonsils +1 no erythema or exudate.  Neck: supple, thyroid palpable,not enlarged, no nodules   Breast: Exam deferred (deferred after discussion of exam options with patient, no symptoms or concerns).  Cardiovascular: regular rate and rhythm, normal S1 and S2, no murmurs, rubs or gallops, peripheral pulses full and symmetric; negative peripheral edema   Respiratory: Air entry throughout. Breathing pattern unlabored without the use of accessory muscles. Clear to auscultation A and P, no wheezes or crackles, normal breath sounds.    Gastrointestinal: rounded, soft. Positive bowel sounds x4, nontender, no masses.   Genitourinary: Exam deferred (deferred after discussion of exam options with patient, no symptoms or concerns, pap is up to date).   Musculoskeletal: full range of motion, no edema.   Skin: pink, turgor smooth and elastic. Negative for lesions or dryness.  Neurological: normal gait, no tremor.   Psychological: " appropriate mood and affect.   Lymphatic: no cervical, axillary, supraclavicular, or infraclavicular lymphadenopathy.        Signed Electronically by: JENARO Kraft CNP

## 2024-04-23 NOTE — PROGRESS NOTES
ASSESSMENT & PLAN     Today we discussed the underlying etiology/pathology of patient's   1. Rotator cuff impingement syndrome of right shoulder    2. Rotator cuff impingement syndrome of left shoulder    3. Primary osteoarthritis of both shoulders, left greater than right- based on XR 04/30/24      -We had a thorough discussion today that patient's right shoulder is more symptomatic than left currently.  Patient has decreased rotation of the right shoulder especially with terminal internal rotation as well as terminal external rotation with the arm forward flex to 90 degrees.  - Updated x-rays today show progression of osteoarthritis of the glenohumeral joint left greater than right with inferior humeral head spur noted on the left as well as some cystic change noted of the glenoid.  Mild degenerative changes noted of the glenohumeral joint on the right side.  - Currently patient's symptoms are more or less annoying.  She states that she would not consider any type of surgical intervention until at least the fall as she plans on golfing all summer.  She has responded to previous subacromial injection therapy treatment and has continued with her home exercise program.  - Our agreed stepwise progression for treatment at this time will be to start her on oral diclofenac 1 tablet every 12 hours for the next 2 weeks to see what her response is for her shoulders as she has never been challenged with an oral NSAID on a regular basis.  Patient will send me a [employer] message upon returning from her vacation in approximately 10 days to see what her responses to the medication.  If she is not doing well patient will come back for bilateral subacromial cortisone injections.  - We then will monitor her symptoms for the next 3 weeks status post subacromial injections.  If she is not doing better at that time I would recommend referral to one of my partners for ultrasound-guided intra-articular cortisone injection of the  symptomatic shoulder.  - Ultimately we likely will need to proceed with advanced imaging of both shoulders to evaluate for chronic shoulder impingement as well as severity of osteoarthritis of the shoulder joints.  - All patient's questions and concerns were addressed today and she may continue with activity as she tolerates.    -Call direct clinic number [[phone]] at any time with questions or concerns in regards to your recent office visit with me.     Wallace Ivan PA-C  [facility]    This note was completed in part using a voice recognition software, any grammatical or context distortion are unintentional and inherent to the software.           SUBJECTIVE  Sury Castellanos is a/an 63 year old female who is seen for follow up of right shoulder pain and is now presenting with bilateral shoulder pain The patient is seen by themselves.   Since last visit on 8/22/23 for right shoulder pain patient has mild improvement but overall the patient reports the symptoms have not subsided. Pt reports most of her symptoms being related to how she sleeps on her sides most of the time.   Right shoulder subacromial CSI 08/22/23  Left shoulder subacromial CSI  04/27/23    Original Date of Injury: chronic    Has previous treatment plan been beneficial for condition: yes   Location of Pain: bilateral shoulder  Rating of Pain at worst: 6/10  Rating of Pain Currently: 0/10 sitting, 3-4/10 with movement  Worsened by: wakes at night occasionally, lifting arms past 90, overhead movements, pushing off with arms in frontal plane  Better with: best with CSI  Treatments tried: home exercises and Advil, CSI R shoulder-subacromial 8/22/23 with 4-5 months relief, CSI for L shoulder provided 5-7 months relief with Dr. Mane  Quality: aching  Associated symptoms: locking or catching, pinching      Past Medical History:   Diagnosis Date    Eczema, unspecified type 02/23/2018     Social History     Socioeconomic  History    Marital status:    Tobacco Use    Smoking status: Never     Passive exposure: Never    Smokeless tobacco: Never   Vaping Use    Vaping status: Never Used   Substance and Sexual Activity    Alcohol use: Not Currently     Comment: 1-3 drinks a month    Drug use: No    Sexual activity: Not Currently     Birth control/protection: None   Other Topics Concern    Parent/sibling w/ CABG, MI or angioplasty before 65F 55M? No   Social History Narrative    22      suddenly in his sleep 4 years ago. Has three daughters. 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    Her 2nd and 3rd dtrs are twins.    First grandchild due in 3 weeks, girl.     Enjoys pickle ball, golf, pilates.        24    Oldest daughter has daughter 1.5 years old, Quiana.     Younger daughter as a 3 month old, Cliff.             Yoselin Trejo, DNP, APRN, CNP    24             Social Determinants of Health     Financial Resource Strain: Low Risk  (3/28/2024)    Financial Resource Strain     Within the past 12 months, have you or your family members you live with been unable to get utilities (heat, electricity) when it was really needed?: No   Food Insecurity: Low Risk  (3/28/2024)    Food Insecurity     Within the past 12 months, did you worry that your food would run out before you got money to buy more?: No     Within the past 12 months, did the food you bought just not last and you didn t have money to get more?: No   Transportation Needs: Low Risk  (3/28/2024)    Transportation Needs     Within the past 12 months, has lack of transportation kept you from medical appointments, getting your medicines, non-medical meetings or appointments, work, or from getting things that you need?: No   Physical Activity: Sufficiently Active (3/28/2024)    Exercise Vital Sign     Days of Exercise per Week: 5 days     Minutes of Exercise per Session: 50 min   Stress: No  Stress Concern Present (3/28/2024)    [employer] [place] [employer] - Occupational Stress Questionnaire     Feeling of Stress : Not at all   Social Connections: Unknown (3/28/2024)    Social Connection and Isolation Panel [NHANES]     Frequency of Social Gatherings with Friends and Family: More than three times a week   Interpersonal Safety: Low Risk  (3/28/2024)    Interpersonal Safety     Do you feel physically and emotionally safe where you currently live?: Yes     Within the past 12 months, have you been hit, slapped, kicked or otherwise physically hurt by someone?: No     Within the past 12 months, have you been humiliated or emotionally abused in other ways by your partner or ex-partner?: No   Housing Stability: Low Risk  (3/28/2024)    Housing Stability     Do you have housing? : Yes     Are you worried about losing your housing?: No         Patient's past medical, surgical, social, and family histories were personally reviewed today and no changes are noted.  REVIEW OF SYSTEMS:  Review of Systems    OBJECTIVE:  Vital signs as noted in EPIC for 4/23/2024    General: healthy, alert and in no distress  HEENT: no scleral icterus or conjunctival erythema  Skin: no suspicious lesions or rash. No jaundice.  CV: no pedal edema  Resp: normal respiratory effort without conversational dyspnea   Psych: normal mood and affect  Gait: normal steady gait with appropriate coordination and balance  Neuro: Normal light sensory exam of lower extremity      MSK:  Exam shows a pleasant 63-year-old female who ambulates full weightbearing without assistive device.  She is alert and orientated x 3.  Examination of both shoulder show no bruising, no swelling and no ecchymosis.  No pain to palpation throughout the sternum, sternoclavicular joints bilaterally, clavicles or AC joints bilaterally.  No particular tenderness in the subacromial space or bicipital grooves approximately of either shoulder.  No pain over the deltoid  or the posterior shoulder girdle.  No thoracic scapular crepitation.  Patient has symmetric range of motion with forward flexion reaching above 160 degrees with some discomfort at the 90 degree juwan for forward flexion.  Abduction is symmetric above 120 degrees.  Internal rotation on the right is to the T10 level versus the T6 level on the left.  She has adequate tone of the rotator cuff in all planes with mild discomfort with resisted external rotation.  She really does not show any significant pain with impingement testing of either shoulder or drawstring testing of the shoulder.  Empty can testing on the right shoulder is symptomatic and uncomfortable causing symptoms along the right lateral deltoid and upper extremity with no symptoms below the elbow level.  Rotator cuff strength again is still maintained of the supraspinatus bilaterally.  Liftoff test negative.  Sulcus sign negative.  No particular pain with isolation of the bicep with speeds test or elbow flexion against resistance.  No Tomasz deformity.  Sulcus sign is negative.  Patient is neurovascularly intact through both upper extremities.    Radiology:  Three-view x-ray of patient's bilateral shoulders are updated and reviewed today.  X-rays reviewed with the patient.  Left shoulder shows degenerative spur/osteophyte on the inferior aspect of the humeral head.  There appears to be some slight cystic changes within the glenoid.  Subacromial space maintained.  No evidence of fracture or dislocation.  Right shoulder shows possible mild early glenohumeral osteoarthritic findings.  No evidence of fracture or dislocation.    Patient's conditions were thoroughly discussed during today's visit with total time reviewing patient's previous medical records/history/radiology, face-to-face examination and discussion and plan of care with the patient and documentation being 30 minutes.    Wallace Ivan PA-C  [facility]    This note was completed  in part using a voice recognition software, any grammatical or context distortion are unintentional and inherent to the software.

## 2024-04-30 ENCOUNTER — ANCILLARY PROCEDURE (OUTPATIENT)
Dept: GENERAL RADIOLOGY | Facility: CLINIC | Age: 64
End: 2024-04-30
Attending: PHYSICIAN ASSISTANT
Payer: COMMERCIAL

## 2024-04-30 ENCOUNTER — OFFICE VISIT (OUTPATIENT)
Dept: ORTHOPEDICS | Facility: CLINIC | Age: 64
End: 2024-04-30
Payer: COMMERCIAL

## 2024-04-30 VITALS
BODY MASS INDEX: 26.52 KG/M2 | WEIGHT: 165 LBS | DIASTOLIC BLOOD PRESSURE: 70 MMHG | SYSTOLIC BLOOD PRESSURE: 120 MMHG | HEIGHT: 66 IN

## 2024-04-30 DIAGNOSIS — M25.511 CHRONIC PAIN OF BOTH SHOULDERS: ICD-10-CM

## 2024-04-30 DIAGNOSIS — M19.011 PRIMARY OSTEOARTHRITIS OF BOTH SHOULDERS: ICD-10-CM

## 2024-04-30 DIAGNOSIS — M19.012 PRIMARY OSTEOARTHRITIS OF BOTH SHOULDERS: ICD-10-CM

## 2024-04-30 DIAGNOSIS — M75.42 ROTATOR CUFF IMPINGEMENT SYNDROME OF LEFT SHOULDER: ICD-10-CM

## 2024-04-30 DIAGNOSIS — G89.29 CHRONIC PAIN OF BOTH SHOULDERS: ICD-10-CM

## 2024-04-30 DIAGNOSIS — M25.512 CHRONIC PAIN OF BOTH SHOULDERS: ICD-10-CM

## 2024-04-30 DIAGNOSIS — M75.41 IMPINGEMENT SYNDROME OF RIGHT SHOULDER: ICD-10-CM

## 2024-04-30 DIAGNOSIS — M75.41 ROTATOR CUFF IMPINGEMENT SYNDROME OF RIGHT SHOULDER: Primary | ICD-10-CM

## 2024-04-30 PROCEDURE — 73030 X-RAY EXAM OF SHOULDER: CPT | Mod: TC | Performed by: RADIOLOGY

## 2024-04-30 PROCEDURE — 99214 OFFICE O/P EST MOD 30 MIN: CPT | Performed by: PHYSICIAN ASSISTANT

## 2024-04-30 NOTE — PATIENT INSTRUCTIONS
Today we discussed the underlying etiology/pathology of patient's   1. Rotator cuff impingement syndrome of right shoulder    2. Rotator cuff impingement syndrome of left shoulder    3. Primary osteoarthritis of both shoulders, left greater than right- based on XR 04/30/24      -We had a thorough discussion today that patient's right shoulder is more symptomatic than left currently.  Patient has decreased rotation of the right shoulder especially with terminal internal rotation as well as terminal external rotation with the arm forward flex to 90 degrees.  - Updated x-rays today show progression of osteoarthritis of the glenohumeral joint left greater than right with inferior humeral head spur noted on the left as well as some cystic change noted of the glenoid.  Mild degenerative changes noted of the glenohumeral joint on the right side.  - Currently patient's symptoms are more or less annoying.  She states that she would not consider any type of surgical intervention until at least the fall as she plans on golfing all summer.  She has responded to previous subacromial injection therapy treatment and has continued with her home exercise program.  - Our agreed stepwise progression for treatment at this time will be to start her on oral diclofenac 1 tablet every 12 hours for the next 2 weeks to see what her response is for her shoulders as she has never been challenged with an oral NSAID on a regular basis.  Patient will send me a [employer] message upon returning from her vacation in approximately 10 days to see what her responses to the medication.  If she is not doing well patient will come back for bilateral subacromial cortisone injections.  - We then will monitor her symptoms for the next 3 weeks status post subacromial injections.  If she is not doing better at that time I would recommend referral to one of my partners for ultrasound-guided intra-articular cortisone injection of the symptomatic shoulder.  -  Ultimately we likely will need to proceed with advanced imaging of both shoulders to evaluate for chronic shoulder impingement as well as severity of osteoarthritis of the shoulder joints.  - All patient's questions and concerns were addressed today and she may continue with activity as she tolerates.    -Call direct clinic number [[phone]] at any time with questions or concerns in regards to your recent office visit with me.     Wallace Ivan PA-C  [facility]    This note was completed in part using a voice recognition software, any grammatical or context distortion are unintentional and inherent to the software.

## 2024-04-30 NOTE — LETTER
4/30/2024         RE: Sury Castellanos  [address] [place] [address]        Dear Colleague,    Thank you for referring your patient, Sury Castellanos, to the [facility]. Please see a copy of my visit note below.    ASSESSMENT & PLAN     Today we discussed the underlying etiology/pathology of patient's   1. Rotator cuff impingement syndrome of right shoulder    2. Rotator cuff impingement syndrome of left shoulder    3. Primary osteoarthritis of both shoulders, left greater than right- based on XR 04/30/24      -We had a thorough discussion today that patient's right shoulder is more symptomatic than left currently.  Patient has decreased rotation of the right shoulder especially with terminal internal rotation as well as terminal external rotation with the arm forward flex to 90 degrees.  - Updated x-rays today show progression of osteoarthritis of the glenohumeral joint left greater than right with inferior humeral head spur noted on the left as well as some cystic change noted of the glenoid.  Mild degenerative changes noted of the glenohumeral joint on the right side.  - Currently patient's symptoms are more or less annoying.  She states that she would not consider any type of surgical intervention until at least the fall as she plans on golfing all summer.  She has responded to previous subacromial injection therapy treatment and has continued with her home exercise program.  - Our agreed stepwise progression for treatment at this time will be to start her on oral diclofenac 1 tablet every 12 hours for the next 2 weeks to see what her response is for her shoulders as she has never been challenged with an oral NSAID on a regular basis.  Patient will send me a [employer] message upon returning from her vacation in approximately 10 days to see what her responses to the medication.  If she is not doing well patient will come back for bilateral  subacromial cortisone injections.  - We then will monitor her symptoms for the next 3 weeks status post subacromial injections.  If she is not doing better at that time I would recommend referral to one of my partners for ultrasound-guided intra-articular cortisone injection of the symptomatic shoulder.  - Ultimately we likely will need to proceed with advanced imaging of both shoulders to evaluate for chronic shoulder impingement as well as severity of osteoarthritis of the shoulder joints.  - All patient's questions and concerns were addressed today and she may continue with activity as she tolerates.    -Call direct clinic number [[phone]] at any time with questions or concerns in regards to your recent office visit with me.     Wallace Ivan PA-C  [facility]    This note was completed in part using a voice recognition software, any grammatical or context distortion are unintentional and inherent to the software.           SUBJECTIVE  Sury Castellanos is a/an 63 year old female who is seen for follow up of right shoulder pain and is now presenting with bilateral shoulder pain The patient is seen by themselves.   Since last visit on 8/22/23 for right shoulder pain patient has mild improvement but overall the patient reports the symptoms have not subsided. Pt reports most of her symptoms being related to how she sleeps on her sides most of the time.   Right shoulder subacromial CSI 08/22/23  Left shoulder subacromial CSI  04/27/23    Original Date of Injury: chronic    Has previous treatment plan been beneficial for condition: yes   Location of Pain: bilateral shoulder  Rating of Pain at worst: 6/10  Rating of Pain Currently: 0/10 sitting, 3-4/10 with movement  Worsened by: wakes at night occasionally, lifting arms past 90, overhead movements, pushing off with arms in frontal plane  Better with: best with CSI  Treatments tried: home exercises and Advil, CSI R shoulder-subacromial  23 with 4-5 months relief, CSI for L shoulder provided 5-7 months relief with Dr. Mane  Quality: aching  Associated symptoms: locking or catching, pinching      Past Medical History:   Diagnosis Date     Eczema, unspecified type 2018     Social History     Socioeconomic History     Marital status:    Tobacco Use     Smoking status: Never     Passive exposure: Never     Smokeless tobacco: Never   Vaping Use     Vaping status: Never Used   Substance and Sexual Activity     Alcohol use: Not Currently     Comment: 1-3 drinks a month     Drug use: No     Sexual activity: Not Currently     Birth control/protection: None   Other Topics Concern     Parent/sibling w/ CABG, MI or angioplasty before 65F 55M? No   Social History Narrative    22      suddenly in his sleep 4 years ago. Has three daughters. 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    Her 2nd and 3rd dtrs are twins.    First grandchild due in 3 weeks, girl.     Enjoys pickle ball, golf, pilates.        24    Oldest daughter has daughter 1.5 years old, Quiana.     Younger daughter as a 3 month old, Cliff.             Yoselin Trejo, DNP, APRN, CNP    24             Social Determinants of Health     Financial Resource Strain: Low Risk  (3/28/2024)    Financial Resource Strain      Within the past 12 months, have you or your family members you live with been unable to get utilities (heat, electricity) when it was really needed?: No   Food Insecurity: Low Risk  (3/28/2024)    Food Insecurity      Within the past 12 months, did you worry that your food would run out before you got money to buy more?: No      Within the past 12 months, did the food you bought just not last and you didn t have money to get more?: No   Transportation Needs: Low Risk  (3/28/2024)    Transportation Needs      Within the past 12 months, has lack of transportation kept you from  medical appointments, getting your medicines, non-medical meetings or appointments, work, or from getting things that you need?: No   Physical Activity: Sufficiently Active (3/28/2024)    Exercise Vital Sign      Days of Exercise per Week: 5 days      Minutes of Exercise per Session: 50 min   Stress: No Stress Concern Present (3/28/2024)    [employer] [place] [employer] - Occupational Stress Questionnaire      Feeling of Stress : Not at all   Social Connections: Unknown (3/28/2024)    Social Connection and Isolation Panel [NHANES]      Frequency of Social Gatherings with Friends and Family: More than three times a week   Interpersonal Safety: Low Risk  (3/28/2024)    Interpersonal Safety      Do you feel physically and emotionally safe where you currently live?: Yes      Within the past 12 months, have you been hit, slapped, kicked or otherwise physically hurt by someone?: No      Within the past 12 months, have you been humiliated or emotionally abused in other ways by your partner or ex-partner?: No   Housing Stability: Low Risk  (3/28/2024)    Housing Stability      Do you have housing? : Yes      Are you worried about losing your housing?: No         Patient's past medical, surgical, social, and family histories were personally reviewed today and no changes are noted.  REVIEW OF SYSTEMS:  Review of Systems    OBJECTIVE:  Vital signs as noted in EPIC for 4/23/2024    General: healthy, alert and in no distress  HEENT: no scleral icterus or conjunctival erythema  Skin: no suspicious lesions or rash. No jaundice.  CV: no pedal edema  Resp: normal respiratory effort without conversational dyspnea   Psych: normal mood and affect  Gait: normal steady gait with appropriate coordination and balance  Neuro: Normal light sensory exam of lower extremity      MSK:  Exam shows a pleasant 63-year-old female who ambulates full weightbearing without assistive device.  She is alert and orientated x 3.  Examination of  both shoulder show no bruising, no swelling and no ecchymosis.  No pain to palpation throughout the sternum, sternoclavicular joints bilaterally, clavicles or AC joints bilaterally.  No particular tenderness in the subacromial space or bicipital grooves approximately of either shoulder.  No pain over the deltoid or the posterior shoulder girdle.  No thoracic scapular crepitation.  Patient has symmetric range of motion with forward flexion reaching above 160 degrees with some discomfort at the 90 degree juwan for forward flexion.  Abduction is symmetric above 120 degrees.  Internal rotation on the right is to the T10 level versus the T6 level on the left.  She has adequate tone of the rotator cuff in all planes with mild discomfort with resisted external rotation.  She really does not show any significant pain with impingement testing of either shoulder or drawstring testing of the shoulder.  Empty can testing on the right shoulder is symptomatic and uncomfortable causing symptoms along the right lateral deltoid and upper extremity with no symptoms below the elbow level.  Rotator cuff strength again is still maintained of the supraspinatus bilaterally.  Liftoff test negative.  Sulcus sign negative.  No particular pain with isolation of the bicep with speeds test or elbow flexion against resistance.  No Tomasz deformity.  Sulcus sign is negative.  Patient is neurovascularly intact through both upper extremities.    Radiology:  Three-view x-ray of patient's bilateral shoulders are updated and reviewed today.  X-rays reviewed with the patient.  Left shoulder shows degenerative spur/osteophyte on the inferior aspect of the humeral head.  There appears to be some slight cystic changes within the glenoid.  Subacromial space maintained.  No evidence of fracture or dislocation.  Right shoulder shows possible mild early glenohumeral osteoarthritic findings.  No evidence of fracture or dislocation.    Patient's conditions were  thoroughly discussed during today's visit with total time reviewing patient's previous medical records/history/radiology, face-to-face examination and discussion and plan of care with the patient and documentation being 30 minutes.    Wallace Ivan PA-C  [facility]    This note was completed in part using a voice recognition software, any grammatical or context distortion are unintentional and inherent to the software.       Again, thank you for allowing me to participate in the care of your patient.        Sincerely,        Wallace Ivan PA-C

## 2024-05-16 ENCOUNTER — MYC MEDICAL ADVICE (OUTPATIENT)
Dept: ORTHOPEDICS | Facility: CLINIC | Age: 64
End: 2024-05-16
Payer: COMMERCIAL

## 2024-05-16 DIAGNOSIS — M75.42 ROTATOR CUFF IMPINGEMENT SYNDROME OF LEFT SHOULDER: ICD-10-CM

## 2024-05-16 DIAGNOSIS — M19.012 PRIMARY OSTEOARTHRITIS OF BOTH SHOULDERS: Primary | ICD-10-CM

## 2024-05-16 DIAGNOSIS — M19.011 PRIMARY OSTEOARTHRITIS OF BOTH SHOULDERS: Primary | ICD-10-CM

## 2024-05-16 DIAGNOSIS — M75.41 ROTATOR CUFF IMPINGEMENT SYNDROME OF RIGHT SHOULDER: ICD-10-CM

## 2024-05-16 NOTE — TELEPHONE ENCOUNTER
Please see MyC message and advise on next steps as patient is improving. LOV notes lay out plan if patient does not improve:     - Our agreed stepwise progression for treatment at this time will be to start her on oral diclofenac 1 tablet every 12 hours for the next 2 weeks to see what her response is for her shoulders as she has never been challenged with an oral NSAID on a regular basis.  Patient will send me a [employer] message upon returning from her vacation in approximately 10 days to see what her responses to the medication.  If she is not doing well patient will come back for bilateral subacromial cortisone injections.  - We then will monitor her symptoms for the next 3 weeks status post subacromial injections.  If she is not doing better at that time I would recommend referral to one of my partners for ultrasound-guided intra-articular cortisone injection of the symptomatic shoulder.  - Ultimately we likely will need to proceed with advanced imaging of both shoulders to evaluate for chronic shoulder impingement as well as severity of osteoarthritis of the shoulder joints.    Letty Calvillo, ATC

## 2024-05-17 NOTE — CONFIDENTIAL NOTE
I spoke with Sury today in regards to her response to oral diclofenac for her shoulders.  She states that she has been amazed at the overall pain control as well as overall significant improved range of motion of her shoulders.  She has had a much better response to oral diclofenac in regards to function, range of motion and pain control than previous corticosteroid injections for the shoulders.  We discussed risks and benefits of continued treatment for either corticosteroid injections or prolonged NSAIDs including the risk of increased cardiovascular effects, GI irritation, GI bleeding with NSAIDs.  At this time patient will continue diclofenac 50 mg tablet daily as needed with supplemental Tylenol up to 3000 mg/day for additional pain control.  She will continue with this plan of care until it becomes ineffective at which time we should reengage in the office.  I have sent off 60 tablets with 1 refill for her to use which likely should cover her for at least 3 months based on her current usage.  Patient understood that she should have annual blood work for liver and renal panels to make sure that she is tolerating medications without concern.  She states that her range of motion for golf and pickleball for both shoulders has been the best it has been within at least a year with oral diclofenac.

## 2024-09-03 ENCOUNTER — PATIENT OUTREACH (OUTPATIENT)
Dept: CARE COORDINATION | Facility: CLINIC | Age: 64
End: 2024-09-03
Payer: COMMERCIAL

## 2024-10-01 ENCOUNTER — PATIENT OUTREACH (OUTPATIENT)
Dept: CARE COORDINATION | Facility: CLINIC | Age: 64
End: 2024-10-01
Payer: COMMERCIAL

## 2024-10-09 ENCOUNTER — ANCILLARY PROCEDURE (OUTPATIENT)
Dept: MAMMOGRAPHY | Facility: CLINIC | Age: 64
End: 2024-10-09
Attending: NURSE PRACTITIONER
Payer: COMMERCIAL

## 2024-10-09 DIAGNOSIS — Z12.31 VISIT FOR SCREENING MAMMOGRAM: ICD-10-CM

## 2024-10-09 PROCEDURE — 77067 SCR MAMMO BI INCL CAD: CPT | Mod: TC | Performed by: RADIOLOGY

## 2024-10-09 PROCEDURE — 77063 BREAST TOMOSYNTHESIS BI: CPT | Mod: TC | Performed by: RADIOLOGY

## 2025-06-17 ENCOUNTER — RESULTS FOLLOW-UP (OUTPATIENT)
Dept: PEDIATRICS | Facility: CLINIC | Age: 65
End: 2025-06-17
Payer: COMMERCIAL